# Patient Record
Sex: MALE | Race: WHITE | HISPANIC OR LATINO | URBAN - METROPOLITAN AREA
[De-identification: names, ages, dates, MRNs, and addresses within clinical notes are randomized per-mention and may not be internally consistent; named-entity substitution may affect disease eponyms.]

---
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
## 2022-03-12 ENCOUNTER — EMERGENCY (EMERGENCY)
Facility: HOSPITAL | Age: 22
LOS: 0 days | Discharge: HOME | End: 2022-03-12
Attending: EMERGENCY MEDICINE | Admitting: EMERGENCY MEDICINE
Payer: MEDICAID

## 2022-03-12 VITALS
HEIGHT: 62 IN | TEMPERATURE: 98 F | SYSTOLIC BLOOD PRESSURE: 119 MMHG | OXYGEN SATURATION: 99 % | RESPIRATION RATE: 16 BRPM | WEIGHT: 119.93 LBS | HEART RATE: 77 BPM | DIASTOLIC BLOOD PRESSURE: 73 MMHG

## 2022-03-12 DIAGNOSIS — S40.012A CONTUSION OF LEFT SHOULDER, INITIAL ENCOUNTER: ICD-10-CM

## 2022-03-12 DIAGNOSIS — K21.9 GASTRO-ESOPHAGEAL REFLUX DISEASE WITHOUT ESOPHAGITIS: Chronic | ICD-10-CM

## 2022-03-12 DIAGNOSIS — Z99.89 DEPENDENCE ON OTHER ENABLING MACHINES AND DEVICES: ICD-10-CM

## 2022-03-12 DIAGNOSIS — Z88.8 ALLERGY STATUS TO OTHER DRUGS, MEDICAMENTS AND BIOLOGICAL SUBSTANCES: ICD-10-CM

## 2022-03-12 DIAGNOSIS — Z90.89 ACQUIRED ABSENCE OF OTHER ORGANS: Chronic | ICD-10-CM

## 2022-03-12 DIAGNOSIS — G80.9 CEREBRAL PALSY, UNSPECIFIED: ICD-10-CM

## 2022-03-12 DIAGNOSIS — Z99.0 DEPENDENCE ON ASPIRATOR: ICD-10-CM

## 2022-03-12 DIAGNOSIS — K21.9 GASTRO-ESOPHAGEAL REFLUX DISEASE WITHOUT ESOPHAGITIS: ICD-10-CM

## 2022-03-12 DIAGNOSIS — F84.0 AUTISTIC DISORDER: ICD-10-CM

## 2022-03-12 DIAGNOSIS — M79.602 PAIN IN LEFT ARM: ICD-10-CM

## 2022-03-12 DIAGNOSIS — Z91.040 LATEX ALLERGY STATUS: ICD-10-CM

## 2022-03-12 DIAGNOSIS — Z98.890 OTHER SPECIFIED POSTPROCEDURAL STATES: Chronic | ICD-10-CM
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
## 2022-03-12 DIAGNOSIS — G47.33 OBSTRUCTIVE SLEEP APNEA (ADULT) (PEDIATRIC): ICD-10-CM

## 2022-03-12 PROCEDURE — 73060 X-RAY EXAM OF HUMERUS: CPT | Mod: 26,LT

## 2022-03-12 PROCEDURE — 73090 X-RAY EXAM OF FOREARM: CPT | Mod: 26,LT

## 2022-03-12 PROCEDURE — 99284 EMERGENCY DEPT VISIT MOD MDM: CPT

## 2022-03-12 RX ORDER — IBUPROFEN 200 MG
400 TABLET ORAL ONCE
Refills: 0 | Status: COMPLETED | OUTPATIENT
Start: 2022-03-12 | End: 2022-03-12

## 2022-03-12 RX ADMIN — Medication 400 MILLIGRAM(S): at 15:50

## 2022-03-12 NOTE — ED ADULT TRIAGE NOTE - NS ED NURSE BANDS TYPE
Rest, ice,  Take ibuprofen for pain, Norco for extreme pain, no driving or drinking while taking Norco take Flexeril to relax muscles, no driving or drinking while taking Flexeril  Follow-up with your primary care doctor or family practice call on Monday for an appointment  Return to the emergency room if symptoms worsen.   Name band;

## 2022-03-12 NOTE — ED PROVIDER NOTE - OBJECTIVE STATEMENT
20 y/o M with PHMx autism presents with mom for left arm pain. Per mom pt fell on his left shoulder while living at [facility] and had a negative XR of his left shoulder on 3/9. Mom brings him to the ED today for continuing left shoulder/ arm pain. Denies new trauma/falls, numbness and weakness.

## 2022-03-12 NOTE — ED PROVIDER NOTE - PHYSICAL EXAMINATION
CONSTITUTIONAL: Well-developed; well-nourished; in no acute distress.  SKIN: Skin exam is warm and dry, no acute rash.  HEAD: Normocephalic; atraumatic.  NECK: Supple; non tender.  EXT: +TTP left shoulder. No tenderness of left elbow/ wrist/ hand. Normal ROM. No clubbing, cyanosis or edema.  NEURO: Alert, oriented. Grossly unremarkable. No focal deficits.  PSYCH: Cooperative, appropriate, at baseline mental status per mom.

## 2022-03-12 NOTE — ED PROVIDER NOTE - PATIENT PORTAL LINK FT
You can access the FollowMyHealth Patient Portal offered by [facility] by registering at the following website: [email or web address]. By joining [employer]’s FollowMyHealth portal, you will also be able to view your health information using other applications (apps) compatible with our system.

## 2022-03-12 NOTE — ED PROVIDER NOTE - CARE PROVIDER_API CALL
Jose Torres (MD)  Orthopaedic Surgery  [address] [place] [address]  Phone: [phone]  Fax: [phone]  Follow Up Time:

## 2022-03-12 NOTE — ED PROVIDER NOTE - CLINICAL SUMMARY MEDICAL DECISION MAKING FREE TEXT BOX
21y male left dominant above PMH bib mom for eval of LUE pain since incident at [facility] (pt states he fell, per mom per staff pt had to be restrained once), had shoulder xr negative for fracture but pain persists, called pediatrician who referred to ED, no fever, no other symptoms, on exam vital signs appreciated, pt has difficulty complying with exam, has no marks of trauma and no swelling, 2+ pulses and cap refill <2s, has FROM at had, wrist, and shoulder but limited abduction/external rotation of shoulder due to pain with tenderness to entire LUE, imaging appreciated and d/w mother possible rotator cuff injury, plexus injury less likely but again neuro exam limited, will place sling for 24 hours, nsaids, f/u with ortho, early ROM encouraged. Mom counseled regarding conditions which should prompt return.

## 2022-03-12 NOTE — ED PROVIDER NOTE - NSFOLLOWUPINSTRUCTIONS_ED_ALL_ED_FT
Follow up with your primary care doctor and orthopedic in 1-2 days        Shoulder Pain    WHAT YOU NEED TO KNOW:    Shoulder pain is a common problem that can affect your daily activities. Pain can be caused by a problem within your shoulder, such as soreness of a tendon or bursa. A tendon is a cord of tough tissue that connects your muscles to your bones. The bursa is a fluid-filled sac that acts as a cushion between a bone and a tendon. Shoulder pain may also be caused by pain that spreads to your shoulder from another part of your body.    Shoulder Anatomy         DISCHARGE INSTRUCTIONS:    Return to the emergency department if:   •You have severe pain.      •You cannot move your arm or shoulder.      •You have numbness or tingling in your shoulder or arm.      Contact your healthcare provider if:   •Your pain gets worse or does not go away with treatment.      •You have trouble moving your arm or shoulder.      •You have questions or concerns about your condition or care.      Medicines: You may need any of the following:   •Acetaminophen decreases pain and fever. It is available without a doctor's order. Ask how much to take and how often to take it. Follow directions. Read the labels of all other medicines you are using to see if they also contain acetaminophen, or ask your doctor or pharmacist. Acetaminophen can cause liver damage if not taken correctly. Do not use more than 4 grams (4,000 milligrams) total of acetaminophen in one day.       •NSAIDs, such as ibuprofen, help decrease swelling, pain, and fever. This medicine is available with or without a doctor's order. NSAIDs can cause stomach bleeding or kidney problems in certain people. If you take blood thinner medicine, always ask your healthcare provider if NSAIDs are safe for you. Always read the medicine label and follow directions.      •Take your medicine as directed. Contact your healthcare provider if you think your medicine is not helping or if you have side effects. Tell him of her if you are allergic to any medicine. Keep a list of the medicines, vitamins, and herbs you take. Include the amounts, and when and why you take them. Bring the list or the pill bottles to follow-up visits. Carry your medicine list with you in case of an emergency.      Manage your symptoms:   •Apply ice on your shoulder for 20 to 30 minutes every 2 hours or as directed. Use an ice pack, or put crushed ice in a plastic bag. Cover it with a towel before you apply it to your shoulder. Ice helps prevent tissue damage and decreases swelling and pain.      •Apply heat if ice does not help your symptoms. Apply heat on your shoulder for 20 to 30 minutes every 2 hours for as many days as directed. Heat helps decrease pain and muscle spasms.      •Limit activities as directed. Try to avoid repeated overhead movements.      •Go to physical or occupational therapy as directed. A physical therapist teaches you exercises to help improve movement and strength, and to decrease pain. An occupational therapist teaches you skills to help with your daily activities.       Prevent shoulder pain:   •Maintain a good range of motion in your shoulder. Ask your healthcare provider which exercises you should do on a regular basis after you have healed.       •Stretch and strengthen your shoulder. Use proper technique during exercises and sports.      Follow up with your healthcare provider or orthopedist as directed: Write down your questions so you remember to ask them during your visits.        © Copyright [employer] 2022

## 2022-03-12 NOTE — ED PROVIDER NOTE - ATTENDING CONTRIBUTION TO CARE
21y male left dominant above PMH bib mom for eval of LUE pain since incident at [facility] (pt states he fell, per mom per staff pt had to be restrained once), had shoulder xr negative for fracture but pain persists, called pediatrician who referred to ED, no fever, no other symptoms, on exam vital signs appreciated, pt has difficulty complying with exam, has no marks of trauma and no swelling, 2+ pulses and cap refill <2s, has FROM at had, wrist, and shoulder but limited abduction/external rotation of shoulder due to pain with tenderness to entire LUE, imaging appreciated and d/w mother possible rotator cuff injury, plexus injury less likely but again neuro exam limited, will place sling for 24 hours, nsaids, f/u with ortho, early ROM encouraged. Mom counseled regarding conditions which should prompt return.

## 2022-03-14 NOTE — DIETITIAN INITIAL EVALUATION PEDIATRIC - OTHER INFO
Pt ambulated to restroom with RN x1 assist. Pt tolerated well. Updated on plan of care.       Zeynep Chaparro RN  03/14/22 3602 Reason for assessment: LOS. Pt with CP, ASD, GERD, epilepsy, central apnea, recent dx of R sided PNA, presented to ED with fever, right side pain, found to have R-sidded effusion, pt is s/p chest tube placement.

## 2022-04-12 NOTE — DIETITIAN INITIAL EVALUATION PEDIATRIC - PERTINENT LABORATORY DATA
Pt here for CBC and review. CBC is stable for this patient at this time. Platelet count is 71 and pt tolerating daily Lovenox injections. Pt stated he is having a TIPS procedure done this Thursday 4/14 and was instructed to hold Lovenox 48 hours prior but asked about when to resume. Message sent to Dr. Espino for instructions. Told patient I would call with MD response. Copy of labs provided and f/u appointment reviewed.    Lab Results   Component Value Date    WBC 3.45 04/12/2022    HGB 8.1 (L) 04/12/2022    HCT 24.6 (L) 04/12/2022    MCV 90.8 04/12/2022    PLT 71 (L) 04/12/2022      (11/6) H/H 11.5/36.2, K 5.5

## 2022-05-26 NOTE — ED PROVIDER NOTE - NS ED SCRIBE STATEMENT
Consult Note  Palliative Care      Consult Requested By: Sadaf Grier MD  Reason for Consult: Goals of care    SUBJECTIVE:     History of Present Illness:  Disease Process: End Stage Renal Disease, Advanced Cardiac Disease    72F with PMH of ESRD on HD MWF, HFpEF, CVA and PE on AC was BIBEMS after a witnessed cardiac arrest at home. Pt ambulated with a walker at baseline, has no children, next of kin are her adult brother and sister. Pt has been living with her special needs sister in a [employer] mobile home since [place] and is driven to and from HD by her brother and has generally been well supported.    Siblings report pt completed HD on Monday 5/23 but her line clotted and the next evening she developed TIA-like symptoms with abnormal arm movements, diaphoresis, facial droop and speech difficulty. Called EMS and by their arrival she was VSS with symptoms apparently resolved. Later that night sometimes after 2100 on 5/24 she was lying down, complained of back pain and became unresponsive. EMS recalled and pt was found in pulseless VT and she was successfully cardioverted back into ROSC. Lost pulse again en route to ED and CPR was continued after arrival to ED with epi, calcium and bicarb pushed. Intubated and ROSC was achieved at 2253.    Pt remained unresponsive off sedation and developed seizure like activity. Stat labs showed profound metabolic disturbances with LA 10.5, pH 7.1, K > 9. Became hypothermic. Reportedly had reactive pupils and a faint gag reflex on arrival to ICU for post arrest care.  NCHCT obtaiend and was non-acute. Started on keppra for seizures, zosyn for aspiration. Nephro consulted for emergent HD.    Interval Hospital Course    5/25: Propofol held in AM to determine if still seizing, immediately provoked severe HTN and was restarted. Fistula had no thrill and a central line was requested for HD access while continuing aggressive potassium shifting. Cards consulted and reported not a cath candidate  given suspected grim neuro prognosis. Pt was dialyzed and started on levo afterwards. EEG performed showing minimal cortical activity c/w catastrophic KIERAN.    5/26: Active warming started early this morning. ICU team reports interval worsening neuro exam with loss of pupillary, gag and cough reflexes. Family gathering for discussion of grave prognosis and next steps.    Palliative has been consulted for goals of care.    At time of interview pt is intubated and unresponsive to all stimuli, not triggering breaths with eccentric pupils, disconjugate gaze, absent corneal reflex. Limbs are spastic without posturing and toes are cool to touch.    Pt's niece and primary caretaker Karyna is at bedside with pleasant affect and minimal health literacy. She reports she has been calling multiple family members to bring them to bedside. Karyna feels that she should be the principal decision maker due to her caretaker role however legally pt's nine adult siblings are next of kin and decisions will be made by consensus among those willing and able to participate. Karyna is not an appropriate surrogate decision maker and consents so far have been from pt's available siblings. Karyna reports pt's eldest brother is [place] ([phone]) and that he is en route with unclear ETA.    Another niece arrived during interview and is visibly shaken, questioning why pt was given thrombolytics in vascular clinic and why she arrested afterwards. I advised her that unfortunately I suspect that pt had a catastrophic thromboembolic event that is an unpredictable and ultimately unavoidable risk of appropriate medical mgmt for her HD difficulties.     Pt's neuro exam has deteriorated such that brain death is more likely than not and the ICU should proceed formal brain death testing if the family will not accept compassionate extubation.    Further CPR is medically futile.     No past medical history on file.  No past surgical history on file.  No  family history on file.       Mental Status: Non-responsive    ECOG Performance Status Grade: 4 - Completely disabled    Review of Systems:  Review of systems not obtained due to patient factors unresponsive.    Review of Symptoms    Symptom Assessment (ESAS 0-10 Scale)  Unable to complete assessment due to Patient unresponsive     CAM / Delirium:  Negative  Constipation:  Negative  Diarrhea:  Negative    Bowel Management Plan (BMP):  No      Pain Assessment in Advanced Demential Scale (PAINAD)   Breathing - Independent of vocalization:  0  Negative vocalization:  0  Facial expression:  0  Body language:  0  Consolability:  0  Total:  0    ECOG Performance Status thGthrthathdtheth:th th5th Living Arrangements:  Lives with family and Lives in home    Psychosocial/Cultural: Lives with developmentally delayed niece    Spiritual:  F - Radha and Belief:  Unknown  I - Importance:  Unknown  C - Community:  9 siblings and extended family  A - Address in Care:  Family meeting today     Time-Based Charting:  Yes  Chart Review: 25 minutes  Face to Face: 20 minutes  Symptom Assessment: 15 minutes  Coordination of Care: 15 minutes    Total Time Spent: 75 minutes      Advance Care Planning   Advance Directives:   Living Will: No        Oral Declaration: No    LaPOST: No    Do Not Resuscitate Status: No    Medical Power of : No        Oral Declaration: No    Agent's Name:  [place]   Agent's Contact Number:  [phone]    Decision Making:  Family answered questions and Patient unable to communicate due to disease severity/cognitive impairment           OBJECTIVE:     Physical Exam  Constitutional:       Appearance: She is normal weight. She is toxic-appearing.      Interventions: She is intubated.   HENT:      Head: Normocephalic and atraumatic.   Eyes:      Comments: Eccentric pupils and disconjugate gaze   Cardiovascular:      Rate and Rhythm: Normal rate and regular rhythm.      Heart sounds: Murmur heard.   Pulmonary:       Effort: She is intubated.      Breath sounds: Rhonchi present.   Abdominal:      General: Abdomen is flat. There is no distension.      Palpations: Abdomen is soft.   Musculoskeletal:         General: No swelling. Normal range of motion.   Neurological:      Cranial Nerves: Cranial nerve deficit present.      Motor: Abnormal muscle tone (spastic) present. No seizure activity.           ASSESSMENT/PLAN:     72F with ESRD on HD with recent thrombosis of LUE AVF s/p outpt thrombolysis (unclear details) presented after a witnessed cardiac arrest at home suspected 2/2 catastrophic VTE. Admitted to ICU for post-arrest care after prolonged CPR with profound metabolic acidosis, hyperkalemia, hypothermia. Intubated with minimal brainstem reflexes and has since lost all responses to stimuli and is apneic on MV off sedation. Palliative consulted for goals of care.    Pt's neuro exam is suspicious for brain death and I agree with proceeding with brain death testing per protocol if family does not agree to compassionate extubation. ICU team to follow up with further goals discussion shortly. Unfortunately pt has no MPOA or children and her devoted caretaker/niece is neither legal next of kin nor an appropriate surrogate decision maker given her intellectual disability. Awaiting pt's adult siblings to arrive for family meeting, awaiting update from primary RN and will assist when they are present.    Recommendations:  Medical: no escalation of care  Symptom Management: unresponsive off sedation at this time  Psychosocial: permanently incapacitated  Legal: no apparent MPOA; legally decisions are per consensus of up to 9 adult siblings if they are willing and able to participate  Prognosis: actively dying; if brain death is confirmed pt will be deemed  under [place] law and withdrawal of care will be compulsory    Ger Mccormack MD  Hospice and Palliative Medicine  Palliative Care Pager: [phone]   ACP

## 2022-07-26 ENCOUNTER — NON-APPOINTMENT (OUTPATIENT)
Age: 22
End: 2022-07-26

## 2022-07-26 ENCOUNTER — APPOINTMENT (OUTPATIENT)
Dept: SPINE | Facility: CLINIC | Age: 22
End: 2022-07-26

## 2022-07-26 VITALS — BODY MASS INDEX: 21.16 KG/M2 | WEIGHT: 115 LBS | HEIGHT: 62 IN

## 2022-07-26 PROCEDURE — 99203 OFFICE O/P NEW LOW 30 MIN: CPT

## 2022-07-26 NOTE — PLAN
[FreeTextEntry1] : We will go and update his x-rays and also obtain CT thoracic and lumbar spine anticipation of surgical planning.  Answered all of his questions and his mom's questions to the best my ability.\par \par I spent 30 minutes on this visit.\par \par Kalia Karimi M.D., M.Sc.\par \par Department of Neurosurgery\par [facility]\par [facility]\par [facility]\par [place], [place]\par [email or web address]\par [phone]

## 2022-07-26 NOTE — HISTORY OF PRESENT ILLNESS
[FreeTextEntry1] : Patient is a 21-year-old man with significant past medical history of seizure disorder bipolar disorder autism as well as psychiatric history presents for evaluation of scoliosis

## 2022-08-04 ENCOUNTER — OUTPATIENT (OUTPATIENT)
Dept: OUTPATIENT SERVICES | Facility: HOSPITAL | Age: 22
LOS: 1 days | Discharge: HOME | End: 2022-08-04

## 2022-08-04 ENCOUNTER — RESULT REVIEW (OUTPATIENT)
Age: 22
End: 2022-08-04

## 2022-08-04 ENCOUNTER — NON-APPOINTMENT (OUTPATIENT)
Age: 22
End: 2022-08-04

## 2022-08-04 DIAGNOSIS — Z90.89 ACQUIRED ABSENCE OF OTHER ORGANS: Chronic | ICD-10-CM

## 2022-08-04 DIAGNOSIS — M41.125 ADOLESCENT IDIOPATHIC SCOLIOSIS, THORACOLUMBAR REGION: ICD-10-CM

## 2022-08-04 DIAGNOSIS — Z98.890 OTHER SPECIFIED POSTPROCEDURAL STATES: Chronic | ICD-10-CM

## 2022-08-04 DIAGNOSIS — K21.9 GASTRO-ESOPHAGEAL REFLUX DISEASE WITHOUT ESOPHAGITIS: Chronic | ICD-10-CM

## 2022-08-04 PROCEDURE — 72082 X-RAY EXAM ENTIRE SPI 2/3 VW: CPT | Mod: 26

## 2022-08-04 PROCEDURE — 77072 BONE AGE STUDIES: CPT | Mod: 26

## 2022-08-04 PROCEDURE — 72131 CT LUMBAR SPINE W/O DYE: CPT | Mod: 26

## 2022-08-05 NOTE — DISCHARGE NOTE PROVIDER - NSDCCAREPROVSEEN_GEN_ALL_CORE_FT
10 am To CT with OR nurse. 10:45 Returned from CT.
DURAGEN PLUS DURAL REGENERATION MATRIX IMPLANT  REF: [id number]  LOT: [id number]  EXP: 2025-03-31
Patient has blue bag with a cell phone and trach supplies in it. Patient refuses to give the bag to nurses to go back to his room. Belongings put in a patient bag with his name on it and kept at his bedside on his bed.
SURGIFOAM WAS GIVEN TO THE STERILE FIELD TO BE USED BY MD DURING PROCEDURE  REF: [id number]  LOT: [id number]  EXP: 03/01/2026    SURGICEL WAS GIVEN TO THE STERILE FIELD TO BE USED BY MD DURING PROCEDURE  REF: 1949  LOT: [id number]  EXP: 01/31/2027    FLOSEAL 10mL WAS GIVEN TO THE STERILE FIELD TO BE USED BY MD  REF: [id number]  LOT: [id number]  EXP: 05/14/2025
Spoke with family waiting, brother Uli De Dios, at start of case
TRANSFER - IN REPORT:    Verbal report received from Keiry Pearson RN(name) on Severo Zoepins  being received from 6S(unit) for ordered procedure      Report consisted of patients Situation, Background, Assessment and   Recommendations(SBAR). Information from the following report(s) SBAR and Kardex was reviewed with the receiving nurse. Opportunity for questions and clarification was provided. Assessment completed upon patients arrival to unit and care assumed.
Dejon Stevens

## 2022-08-08 DIAGNOSIS — Z02.9 ENCOUNTER FOR ADMINISTRATIVE EXAMINATIONS, UNSPECIFIED: ICD-10-CM

## 2022-08-30 ENCOUNTER — APPOINTMENT (OUTPATIENT)
Dept: SPINE | Facility: CLINIC | Age: 22
End: 2022-08-30

## 2022-08-30 DIAGNOSIS — M41.127 ADOLESCENT IDIOPATHIC SCOLIOSIS, LUMBOSACRAL REGION: ICD-10-CM

## 2022-08-30 PROCEDURE — 99214 OFFICE O/P EST MOD 30 MIN: CPT

## 2022-08-30 NOTE — PLAN
[FreeTextEntry1] : This curve is stable measuring 45degrees at this point with the curve remaining stable I think that waiting on surgery would be reasonable given his recent discharge from [facility] and also his improvement in pain with his weight loss I would like to watch him for another 6 months he and his mom will return in 6 months for another consultation at that point in time I answered all her questions the best my ability.\par \par I spent 30 minutes on this visit.\par \par Kalia Karimi M.D., M.Sc.\par \par Department of Neurosurgery\par [facility]\par [facility]\par [facility]\par [place], [place]\par [email or web address]\par [phone]

## 2022-08-30 NOTE — HISTORY OF PRESENT ILLNESS
[FreeTextEntry1] : Patient returns today.  They recently returned from at [facility] with her on vacation Marcell has occasional pain and it was particularly bad while riding in the car to and from [place]

## 2022-10-14 NOTE — DISCHARGE NOTE PROVIDER - NSDCMRMEDTOKEN_GEN_ALL_CORE_FT
clonazePAM 0.5 mg oral tablet: 1 tab(s) orally 2 times a day  divalproex sodium 125 mg oral delayed release capsule: 5 cap(s) orally every 12 hours  famotidine 40 mg/5 mL oral liquid: 5 milliliter(s) orally once a day (at bedtime)  FLUoxetine 10 mg oral capsule: 1 cap(s) orally   guanFACINE 4 mg oral tablet, extended release: 1 tab(s) orally once a day  QUEtiapine 200 mg oral tablet, extended release: tab(s) orally  2 tabs in morning  2 tabs night  1 tab afternoon   raNITIdine 15 mg/mL oral syrup: 5 milliliter(s) orally every 12 hours  risperiDONE 2 mg oral tablet: 1 tab(s) orally   
4 = No assist / stand by assistance

## 2022-12-05 NOTE — ED ADULT TRIAGE NOTE - BMI (KG/M2)
21.9
37 yo male with PMHx marijuana use, cyclic vomiting syndrome, pancreatitis who presents with abdominal pain starting today. Pain is epigastric and also in back. Multiple similar presentations in the past.   AP - Hd stable, appears uncomfortable. likely cyclic vomiting syndrome. will check labs, treat supportively. consider imaging if no improvement.

## 2023-01-04 NOTE — PROGRESS NOTE BEHAVIORAL HEALTH - NSBHADDHXFAMFT_PSY_A_CORE
Patient has 2 biological siblings, 22 y/o brother with bipolar disorder vs schizophrenia/schizoaffective and homicidal ideation. and 20 year old sister with depression vs. bipolar that is well managed on medications.
none

## 2023-01-11 NOTE — PHARMACOTHERAPY INTERVENTION NOTE - INTERVENTION CATEGORIES
Pt still continues to deny subjective s/s of withdrawal   RN notes no objective s/s of withdrawal at this time  Dora Brothers PA-C notified via face-to-face conversation, SEWS score continues to be deferred at this time  Therapy

## 2023-01-18 ENCOUNTER — EMERGENCY (EMERGENCY)
Facility: HOSPITAL | Age: 23
LOS: 0 days | Discharge: HOME | End: 2023-01-18
Attending: STUDENT IN AN ORGANIZED HEALTH CARE EDUCATION/TRAINING PROGRAM | Admitting: STUDENT IN AN ORGANIZED HEALTH CARE EDUCATION/TRAINING PROGRAM
Payer: MEDICAID

## 2023-01-18 VITALS
HEART RATE: 91 BPM | DIASTOLIC BLOOD PRESSURE: 89 MMHG | OXYGEN SATURATION: 98 % | RESPIRATION RATE: 18 BRPM | WEIGHT: 127.87 LBS | SYSTOLIC BLOOD PRESSURE: 138 MMHG | TEMPERATURE: 99 F

## 2023-01-18 VITALS
SYSTOLIC BLOOD PRESSURE: 105 MMHG | OXYGEN SATURATION: 98 % | RESPIRATION RATE: 18 BRPM | TEMPERATURE: 95 F | HEART RATE: 77 BPM | DIASTOLIC BLOOD PRESSURE: 55 MMHG

## 2023-01-18 DIAGNOSIS — Z98.890 OTHER SPECIFIED POSTPROCEDURAL STATES: Chronic | ICD-10-CM

## 2023-01-18 DIAGNOSIS — R11.2 NAUSEA WITH VOMITING, UNSPECIFIED: ICD-10-CM

## 2023-01-18 DIAGNOSIS — Z91.048 OTHER NONMEDICINAL SUBSTANCE ALLERGY STATUS: ICD-10-CM

## 2023-01-18 DIAGNOSIS — Z88.0 ALLERGY STATUS TO PENICILLIN: ICD-10-CM

## 2023-01-18 DIAGNOSIS — Z87.01 PERSONAL HISTORY OF PNEUMONIA (RECURRENT): ICD-10-CM

## 2023-01-18 DIAGNOSIS — R10.9 UNSPECIFIED ABDOMINAL PAIN: ICD-10-CM

## 2023-01-18 DIAGNOSIS — F84.0 AUTISTIC DISORDER: ICD-10-CM

## 2023-01-18 DIAGNOSIS — Z91.040 LATEX ALLERGY STATUS: ICD-10-CM

## 2023-01-18 DIAGNOSIS — K21.9 GASTRO-ESOPHAGEAL REFLUX DISEASE WITHOUT ESOPHAGITIS: Chronic | ICD-10-CM

## 2023-01-18 DIAGNOSIS — R19.7 DIARRHEA, UNSPECIFIED: ICD-10-CM

## 2023-01-18 DIAGNOSIS — F63.9 IMPULSE DISORDER, UNSPECIFIED: ICD-10-CM

## 2023-01-18 DIAGNOSIS — M41.9 SCOLIOSIS, UNSPECIFIED: ICD-10-CM

## 2023-01-18 DIAGNOSIS — Z87.19 PERSONAL HISTORY OF OTHER DISEASES OF THE DIGESTIVE SYSTEM: ICD-10-CM

## 2023-01-18 DIAGNOSIS — Z90.89 ACQUIRED ABSENCE OF OTHER ORGANS: Chronic | ICD-10-CM

## 2023-01-18 DIAGNOSIS — E87.1 HYPO-OSMOLALITY AND HYPONATREMIA: ICD-10-CM

## 2023-01-18 DIAGNOSIS — Z90.09 ACQUIRED ABSENCE OF OTHER PART OF HEAD AND NECK: ICD-10-CM

## 2023-01-18 DIAGNOSIS — G47.33 OBSTRUCTIVE SLEEP APNEA (ADULT) (PEDIATRIC): ICD-10-CM

## 2023-01-18 DIAGNOSIS — Z20.822 CONTACT WITH AND (SUSPECTED) EXPOSURE TO COVID-19: ICD-10-CM

## 2023-01-18 DIAGNOSIS — G80.9 CEREBRAL PALSY, UNSPECIFIED: ICD-10-CM

## 2023-01-18 DIAGNOSIS — Z86.69 PERSONAL HISTORY OF OTHER DISEASES OF THE NERVOUS SYSTEM AND SENSE ORGANS: ICD-10-CM

## 2023-01-18 DIAGNOSIS — Z88.8 ALLERGY STATUS TO OTHER DRUGS, MEDICAMENTS AND BIOLOGICAL SUBSTANCES STATUS: ICD-10-CM

## 2023-01-18 LAB
ALBUMIN SERPL ELPH-MCNC: 4.9 G/DL — SIGNIFICANT CHANGE UP (ref 3.5–5.2)
ALP SERPL-CCNC: 106 U/L — SIGNIFICANT CHANGE UP (ref 30–115)
ALT FLD-CCNC: 18 U/L — SIGNIFICANT CHANGE UP (ref 13–38)
ANION GAP SERPL CALC-SCNC: 14 MMOL/L — SIGNIFICANT CHANGE UP (ref 7–14)
AST SERPL-CCNC: 53 U/L — HIGH (ref 13–38)
BASOPHILS # BLD AUTO: 0.01 K/UL — SIGNIFICANT CHANGE UP (ref 0–0.2)
BASOPHILS NFR BLD AUTO: 0.2 % — SIGNIFICANT CHANGE UP (ref 0–1)
BILIRUB DIRECT SERPL-MCNC: <0.2 MG/DL — SIGNIFICANT CHANGE UP (ref 0–0.3)
BILIRUB INDIRECT FLD-MCNC: >0.1 MG/DL — LOW (ref 0.2–1.2)
BILIRUB SERPL-MCNC: 0.3 MG/DL — SIGNIFICANT CHANGE UP (ref 0.2–1.2)
BUN SERPL-MCNC: 19 MG/DL — SIGNIFICANT CHANGE UP (ref 10–20)
CALCIUM SERPL-MCNC: 9.5 MG/DL — SIGNIFICANT CHANGE UP (ref 8.4–10.5)
CHLORIDE SERPL-SCNC: 94 MMOL/L — LOW (ref 98–110)
CO2 SERPL-SCNC: 24 MMOL/L — SIGNIFICANT CHANGE UP (ref 17–32)
CREAT SERPL-MCNC: 0.6 MG/DL — LOW (ref 0.7–1.5)
EGFR: 142 ML/MIN/1.73M2 — SIGNIFICANT CHANGE UP
EOSINOPHIL # BLD AUTO: 0 K/UL — SIGNIFICANT CHANGE UP (ref 0–0.7)
EOSINOPHIL NFR BLD AUTO: 0 % — SIGNIFICANT CHANGE UP (ref 0–8)
FLUAV AG NPH QL: SIGNIFICANT CHANGE UP
FLUBV AG NPH QL: SIGNIFICANT CHANGE UP
GLUCOSE SERPL-MCNC: 144 MG/DL — HIGH (ref 70–99)
HCT VFR BLD CALC: 43.9 % — SIGNIFICANT CHANGE UP (ref 42–52)
HGB BLD-MCNC: 14.8 G/DL — SIGNIFICANT CHANGE UP (ref 14–18)
IMM GRANULOCYTES NFR BLD AUTO: 0.3 % — SIGNIFICANT CHANGE UP (ref 0.1–0.3)
LIDOCAIN IGE QN: 28 U/L — SIGNIFICANT CHANGE UP (ref 7–60)
LYMPHOCYTES # BLD AUTO: 0.4 K/UL — LOW (ref 1.2–3.4)
LYMPHOCYTES # BLD AUTO: 6.6 % — LOW (ref 20.5–51.1)
MAGNESIUM SERPL-MCNC: 1.9 MG/DL — SIGNIFICANT CHANGE UP (ref 1.8–2.4)
MCHC RBC-ENTMCNC: 28.5 PG — SIGNIFICANT CHANGE UP (ref 27–31)
MCHC RBC-ENTMCNC: 33.7 G/DL — SIGNIFICANT CHANGE UP (ref 32–37)
MCV RBC AUTO: 84.6 FL — SIGNIFICANT CHANGE UP (ref 80–94)
MONOCYTES # BLD AUTO: 0.84 K/UL — HIGH (ref 0.1–0.6)
MONOCYTES NFR BLD AUTO: 13.8 % — HIGH (ref 1.7–9.3)
NEUTROPHILS # BLD AUTO: 4.83 K/UL — SIGNIFICANT CHANGE UP (ref 1.4–6.5)
NEUTROPHILS NFR BLD AUTO: 79.1 % — HIGH (ref 42.2–75.2)
NRBC # BLD: 0 /100 WBCS — SIGNIFICANT CHANGE UP (ref 0–0)
PLATELET # BLD AUTO: 94 K/UL — LOW (ref 130–400)
POTASSIUM SERPL-MCNC: 6.2 MMOL/L — CRITICAL HIGH (ref 3.5–5)
POTASSIUM SERPL-SCNC: 6.2 MMOL/L — CRITICAL HIGH (ref 3.5–5)
PROT SERPL-MCNC: 8.4 G/DL — HIGH (ref 6–8)
RBC # BLD: 5.19 M/UL — SIGNIFICANT CHANGE UP (ref 4.7–6.1)
RBC # FLD: 12.5 % — SIGNIFICANT CHANGE UP (ref 11.5–14.5)
RSV RNA NPH QL NAA+NON-PROBE: SIGNIFICANT CHANGE UP
SARS-COV-2 RNA SPEC QL NAA+PROBE: SIGNIFICANT CHANGE UP
SODIUM SERPL-SCNC: 132 MMOL/L — LOW (ref 135–146)
VALPROATE SERPL-MCNC: 94 UG/ML — SIGNIFICANT CHANGE UP (ref 50–100)
WBC # BLD: 6.1 K/UL — SIGNIFICANT CHANGE UP (ref 4.8–10.8)
WBC # FLD AUTO: 6.1 K/UL — SIGNIFICANT CHANGE UP (ref 4.8–10.8)

## 2023-01-18 PROCEDURE — 99285 EMERGENCY DEPT VISIT HI MDM: CPT

## 2023-01-18 PROCEDURE — 93010 ELECTROCARDIOGRAM REPORT: CPT

## 2023-01-18 PROCEDURE — 71045 X-RAY EXAM CHEST 1 VIEW: CPT | Mod: 26

## 2023-01-18 PROCEDURE — 74019 RADEX ABDOMEN 2 VIEWS: CPT | Mod: 26

## 2023-01-18 PROCEDURE — 74177 CT ABD & PELVIS W/CONTRAST: CPT | Mod: 26,MA

## 2023-01-18 RX ORDER — ONDANSETRON 8 MG/1
4 TABLET, FILM COATED ORAL ONCE
Refills: 0 | Status: COMPLETED | OUTPATIENT
Start: 2023-01-18 | End: 2023-01-18

## 2023-01-18 RX ORDER — KETOROLAC TROMETHAMINE 30 MG/ML
30 SYRINGE (ML) INJECTION ONCE
Refills: 0 | Status: DISCONTINUED | OUTPATIENT
Start: 2023-01-18 | End: 2023-01-18

## 2023-01-18 RX ORDER — FAMOTIDINE 10 MG/ML
20 INJECTION INTRAVENOUS ONCE
Refills: 0 | Status: COMPLETED | OUTPATIENT
Start: 2023-01-18 | End: 2023-01-18

## 2023-01-18 RX ORDER — EPINEPHRINE 0.3 MG/.3ML
0.3 INJECTION INTRAMUSCULAR; SUBCUTANEOUS ONCE
Refills: 0 | Status: DISCONTINUED | OUTPATIENT
Start: 2023-01-18 | End: 2023-01-18

## 2023-01-18 RX ORDER — DIATRIZOATE MEGLUMINE 180 MG/ML
30 INJECTION, SOLUTION INTRAVESICAL ONCE
Refills: 0 | Status: COMPLETED | OUTPATIENT
Start: 2023-01-18 | End: 2023-01-18

## 2023-01-18 RX ORDER — SODIUM CHLORIDE 9 MG/ML
1000 INJECTION INTRAMUSCULAR; INTRAVENOUS; SUBCUTANEOUS ONCE
Refills: 0 | Status: COMPLETED | OUTPATIENT
Start: 2023-01-18 | End: 2023-01-18

## 2023-01-18 RX ORDER — MORPHINE SULFATE 50 MG/1
2 CAPSULE, EXTENDED RELEASE ORAL ONCE
Refills: 0 | Status: DISCONTINUED | OUTPATIENT
Start: 2023-01-18 | End: 2023-01-18

## 2023-01-18 RX ADMIN — DIATRIZOATE MEGLUMINE 30 MILLILITER(S): 180 INJECTION, SOLUTION INTRAVESICAL at 04:00

## 2023-01-18 RX ADMIN — Medication 30 MILLIGRAM(S): at 12:06

## 2023-01-18 RX ADMIN — MORPHINE SULFATE 2 MILLIGRAM(S): 50 CAPSULE, EXTENDED RELEASE ORAL at 05:00

## 2023-01-18 RX ADMIN — SODIUM CHLORIDE 1000 MILLILITER(S): 9 INJECTION INTRAMUSCULAR; INTRAVENOUS; SUBCUTANEOUS at 04:00

## 2023-01-18 RX ADMIN — ONDANSETRON 4 MILLIGRAM(S): 8 TABLET, FILM COATED ORAL at 04:00

## 2023-01-18 RX ADMIN — FAMOTIDINE 100 MILLIGRAM(S): 10 INJECTION INTRAVENOUS at 04:00

## 2023-01-18 NOTE — ED PROVIDER NOTE - NSFOLLOWUPINSTRUCTIONS_ED_ALL_ED_FT
Please continue to encourage plenty of fluids.  If patient develops vomiting, uncontrolled pain, return to the ER.    Enteritis    WHAT YOU NEED TO KNOW:    Enteritis is inflammation of the small intestine. It may be caused by eating foods or drinking liquids contaminated with a virus, bacteria, or parasites. It may also be caused by certain medicines, damage from radiation, and medical conditions such as Crohn disease.     DISCHARGE INSTRUCTIONS:    Return to the emergency department if:     You cannot stop vomiting.      You have not urinated for 12 hours.     Contact your healthcare provider if:     You have a fever over 101.5.       You have blood or mucus in your bowel movements.       You continue to vomit or have diarrhea for more than 3 days, even after treatment.      You have a dry mouth and eyes, you are urinating less than usual, and you feel dizzy when you stand up.       Your mouth or eyes are dry. You are not urinating as much or as often.      You are losing weight without trying.      You have questions or concerns about your condition or care.    Medicines:     Medicines may be given to fight an infection caused by bacteria or a parasite. You may also need medicines to slow or stop your diarrhea or vomiting. Do not take these medicines unless your healthcare provider say it is okay. Other medicines may be needed to treat medical conditions that are causing enteritis.       Take your medicine as directed. Contact your healthcare provider if you think your medicine is not helping or if you have side effects. Tell him of her if you are allergic to any medicine. Keep a list of the medicines, vitamins, and herbs you take. Include the amounts, and when and why you take them. Bring the list or the pill bottles to follow-up visits. Carry your medicine list with you in case of an emergency.    Manage enteritis:     Eat foods that help to decrease symptoms. Limit or avoid foods and liquids that are high in sugar, fat, and fiber to help relieve diarrhea. It may be helpful to avoid lactose. Lactose is a type of sugar that is found in milk products. You may be able to tolerate soups, broths, well-cooked vegetables, canned fruit, and baked or broiled meats. Ask your dietitian or healthcare provider if you should follow a special diet. You may need to avoid other foods if you have certain medical conditions such as celiac disease.       Drink liquids as directed. Ask how much liquid to drink each day and which liquids are best for you. It is important to prevent or treat dehydration. Even if you have been vomiting, suck on ice chips or take small sips of clear liquids often. Slowly increase the amount of clear liquids you drink. If you become dehydrated, you may need IV liquids.      Drink an oral rehydration solution (ORS) as directed. An ORS contains water, salts, and sugar that are needed to replace lost body fluids. Ask what kind of ORS to use, how much to drink, and where to get it.    Prevent enteritis: Enteritis that is caused by bacteria, parasites, or viruses can be prevented. The following may help to prevent this type of enteritis:    Wash your hands often. Use soap and water. Wash your hands after you use the bathroom, change a child's diapers, or sneeze. Wash your hands before you prepare or eat food. Handwashing           Clean surfaces and do laundry often. Wash your clothes and towels separately from the rest of the laundry. Clean surfaces in your home with antibacterial  or bleach.      Clean food thoroughly and cook safely. Wash raw vegetables before you cook. Cook meat, fish, and eggs fully. Do not use the same dishes for raw meat as you do for other foods. Refrigerate any leftover food immediately.      Be aware when you camp or travel. Drink only clean water. Do not drink from rivers or lakes unless you purify or boil the water first. When you travel, drink bottled water and do not add ice. Do not eat fruit that has not been peeled. Do not eat raw fish or meat that is not fully cooked.     Follow up with your healthcare provider as directed: Write down your questions so you remember to ask them during your visits.

## 2023-01-18 NOTE — CONSULT NOTE ADULT - SUBJECTIVE AND OBJECTIVE BOX
HPI: 21 yo male with CP, autism, seizures, severe GERD s/p fundoplication at 18months old presents with severe abdominal pain beginning last PM around 4. Mom states pt was fine throughout day, ate lunch without difficulty. Then began screaming with pain around 4PM. He states it was his whole abdomen. He vomited once. Mom states he did have diarrhea a few weeks ago and has been complaining of feeling like food is getting stuck and having pain after eating for several weeks now. He hasnt seen GI in a "while" Last EGD a "few years" ago.     Pt just had BM loose (after PO contrast) and is passing flatus. Has not vomited in ED        PAST MEDICAL & SURGICAL HISTORY:  Autism      GERD (gastroesophageal reflux disease)      Cerebral palsy      Seizures      Pneumonia  NOV 2020      BINDU on CPAP      Impulse disorder      Scoliosis      S/P tonsillectomy  adenoidectomy        H/O chest tube placement      GERD with stricture  Surgery done          MEDICATIONS  (STANDING):    MEDICATIONS  (PRN):      Allergies    Benadryl Allergy (Other)  latex (Other)  lithium (Rash)  penicillin (Hives)    Intolerances        SOCIAL HISTORY:    FAMILY HISTORY:          Physical Exam:  General: NAD, resting comfortably  HEENT: NC/AT, EOMI, normal hearing, no oral lesions, no LAD, neck supple  Pulmonary: normal resp effort, CTA-B  Cardiovascular: NSR, no murmurs  Abdominal: soft, ND/NT, no organomegaly, +bs  Extremities: WWP, normal strength, no clubbing/cyanosis/edema  Neuro: A/O x 3, CNs II-XII grossly intact, normal sensation, no focal deficits  Pulses: palpable distal pulses    Vital Signs Last 24 Hrs  T(C): 35.8 (18 Jan 2023 11:41), Max: 37 (18 Jan 2023 01:51)  T(F): 96.5 (18 Jan 2023 11:41), Max: 98.6 (18 Jan 2023 01:51)  HR: 84 (18 Jan 2023 11:41) (84 - 91)  BP: 125/73 (18 Jan 2023 11:41) (125/73 - 138/89)  BP(mean): --  RR: 18 (18 Jan 2023 11:41) (18 - 18)  SpO2: 99% (18 Jan 2023 11:41) (98% - 99%)    Parameters below as of 18 Jan 2023 11:41  Patient On (Oxygen Delivery Method): room air        I&O's Summary          LABS:                        14.8   6.10  )-----------( 94       ( 18 Jan 2023 05:00 )             43.9     01-18    132<L>  |  94<L>  |  19  ----------------------------<  144<H>  6.2<HH>   |  24  |  0.6<L>    Ca    9.5      18 Jan 2023 05:00  Mg     1.9     01-18    TPro  8.4<H>  /  Alb  4.9  /  TBili  0.3  /  DBili  <0.2  /  AST  53<H>  /  ALT  18  /  AlkPhos  106  01-18        CAPILLARY BLOOD GLUCOSE        LIVER FUNCTIONS - ( 18 Jan 2023 05:00 )  Alb: 4.9 g/dL / Pro: 8.4 g/dL / ALK PHOS: 106 U/L / ALT: 18 U/L / AST: 53 U/L / GGT: x                   RADIOLOGY & ADDITIONAL STUDIES:      Plan:

## 2023-01-18 NOTE — ED PROVIDER NOTE - PHYSICAL EXAMINATION
Physical Exam    Vital Signs: I have reviewed the initial vital signs.  Constitutional:  no acute distress  Eyes: Conjunctiva pink, Sclera clear, PERRLA, EOMI.  Cardiovascular: S1 and S2, regular rate, regular rhythm, well-perfused extremities, radial pulses equal and 2+  Respiratory: unlabored respiratory effort, clear to auscultation bilaterally no wheezing, rales and rhonchi  Gastrointestinal: soft, generalized tenderness, no pulsatile mass, normal bowl sounds  Musculoskeletal: supple neck, no lower extremity edema, no midline tenderness  Integumentary: warm, dry, no rash  Neurologic: awake, alert moving all extremities

## 2023-01-18 NOTE — ED ADULT NURSE NOTE - NSIMPLEMENTINTERV_GEN_ALL_ED
Implemented All Fall Risk Interventions:  [place] to call system. Call bell, personal items and telephone within reach. Instruct patient to call for assistance. Room bathroom lighting operational. Non-slip footwear when patient is off stretcher. Physically safe environment: no spills, clutter or unnecessary equipment. Stretcher in lowest position, wheels locked, appropriate side rails in place. Provide visual cue, wrist band, yellow gown, etc. Monitor gait and stability. Monitor for mental status changes and reorient to person, place, and time. Review medications for side effects contributing to fall risk. Reinforce activity limits and safety measures with patient and family.

## 2023-01-18 NOTE — ED PROVIDER NOTE - CARE PLAN
1 Principal Discharge DX:	SBO (small bowel obstruction)   Principal Discharge DX:	Nausea vomiting and diarrhea

## 2023-01-18 NOTE — ED PROVIDER NOTE - OBJECTIVE STATEMENT
22 year old male past medical history of Autism, cerebral palsy come to re for abd pain with nausea and vomiting.

## 2023-01-18 NOTE — ED PROVIDER NOTE - AGGRAVATING FACTORS
Been Following SD  Last OV 7/12/21  Last CPE 7/12/21  Last Labs TSH+Free T4, CBC, CMP, Lipid, Vit D 7/1/21    Last Rx fill Zolpidem 5mg #30 1R 7/2/21    Future Appointments   Date Time Provider Arpita Bautista   9/14/2021  9:00 Josh Ray MD [facility] none

## 2023-01-18 NOTE — ED PROVIDER NOTE - NS ED ATTENDING STATEMENT MOD
This was a shared visit with the LEILA. I reviewed and verified the documentation and independently performed the documented:

## 2023-01-18 NOTE — ED PROVIDER NOTE - ATTENDING APP SHARED VISIT CONTRIBUTION OF CARE
I reviewed and verified the documentation and  and independently performed the documented    He is-year-old male history of seizures autism on multiple medications presents with abdominal pain nausea vomiting since 4 PM abdomen soft mild generalized tenderness  exam normal no icterus no jaundice.  No function within normal limits mild elevation of AST 53 lipase normal   CT ap

## 2023-01-18 NOTE — ED PROVIDER NOTE - CLINICAL SUMMARY MEDICAL DECISION MAKING FREE TEXT BOX
22 year old male past medical history of Autism, cerebral palsy come to re for abd pain with nausea and vomiting. Labs and imaging personally reviewed.  No leukocytosis, mild hyponatremia noted.  CT shows evidence of SBO.  Patient and family were updated at bedside.  Surgery consulted, recommendations appreciated. 22 year old male past medical history of Autism, cerebral palsy come to re for abd pain with nausea and vomiting. Labs and imaging personally reviewed.  No leukocytosis, mild hyponatremia noted.  CT shows evidence concerning for SBO.  Patient and family were updated at bedside.  Surgery consulted, recommendations appreciated. Patient was in the ED had multiple loose bowel movements on reassessment smiling, well-appearing.  Abdomen soft nontender.  Patient was given juice, milk, drink both indicated well-appearing reassessment, abdomen soft, nontender.  Discussed return precautions, follow-up instructions, and family verbalized understanding.

## 2023-01-18 NOTE — CONSULT NOTE ADULT - ATTENDING COMMENTS
above noted discussed case with surgical resident and PA ct scan and labs noted pt had BM and Flatus

## 2023-01-18 NOTE — ED PROVIDER NOTE - PROGRESS NOTE DETAILS
Sx JAD Verdugo aware JR; JAD Verdugo viewed the XR, no contrast visualized. Surgery will not admit to their service. Plan to admit to medicine.

## 2023-01-18 NOTE — ED PROVIDER NOTE - PATIENT PORTAL LINK FT
You can access the FollowMyHealth Patient Portal offered by [facility] by registering at the following website: [email or web address]. By joining [facility]’s FollowMyHealth portal, you will also be able to view your health information using other applications (apps) compatible with our system.

## 2023-01-18 NOTE — CONSULT NOTE ADULT - ASSESSMENT
21 yo male with CP, autism with SB dilation.          Plan:  - keep NPO for now  -  23 yo male with CP, autism with SB dilation.          Plan:  - keep NPO for now  - get abdominal xray 3 views to evaluate if contrast has reached colon        Above D/W Dr Smiley  23 yo male with CP, autism with SB dilation.          Plan:  - keep NPO for now  - get abdominal xray 3 views to evaluate if contrast has reached colon        Above D/W Dr Smiley     Adden:  Pt feeling much improved. Denies pain, has had 2 BMs today and ++flatus. Father now at bedside and states pt is much improved.  Xray unable to assess for progression of contrast.  Dr Smiley aware  If family wants pt home rec close fup with PMD and GI

## 2023-02-08 ENCOUNTER — OUTPATIENT (OUTPATIENT)
Dept: OUTPATIENT SERVICES | Facility: HOSPITAL | Age: 23
LOS: 1 days | End: 2023-02-08
Payer: MEDICAID

## 2023-02-08 ENCOUNTER — APPOINTMENT (OUTPATIENT)
Dept: GASTROENTEROLOGY | Facility: CLINIC | Age: 23
End: 2023-02-08
Payer: MEDICAID

## 2023-02-08 ENCOUNTER — APPOINTMENT (OUTPATIENT)
Dept: GASTROENTEROLOGY | Facility: CLINIC | Age: 23
End: 2023-02-08

## 2023-02-08 VITALS
OXYGEN SATURATION: 98 % | WEIGHT: 108 LBS | HEART RATE: 153 BPM | HEIGHT: 62 IN | DIASTOLIC BLOOD PRESSURE: 66 MMHG | BODY MASS INDEX: 19.88 KG/M2 | SYSTOLIC BLOOD PRESSURE: 95 MMHG

## 2023-02-08 DIAGNOSIS — K21.9 GASTRO-ESOPHAGEAL REFLUX DISEASE WITHOUT ESOPHAGITIS: Chronic | ICD-10-CM

## 2023-02-08 DIAGNOSIS — Z00.00 ENCOUNTER FOR GENERAL ADULT MEDICAL EXAMINATION WITHOUT ABNORMAL FINDINGS: ICD-10-CM

## 2023-02-08 DIAGNOSIS — Z98.890 OTHER SPECIFIED POSTPROCEDURAL STATES: Chronic | ICD-10-CM

## 2023-02-08 DIAGNOSIS — Z78.9 OTHER SPECIFIED HEALTH STATUS: ICD-10-CM

## 2023-02-08 DIAGNOSIS — K21.9 GASTRO-ESOPHAGEAL REFLUX DISEASE W/OUT ESOPHAGITIS: ICD-10-CM

## 2023-02-08 DIAGNOSIS — K63.9 DISEASE OF INTESTINE, UNSPECIFIED: ICD-10-CM

## 2023-02-08 DIAGNOSIS — K56.600 PARTIAL INTESTINAL OBSTRUCTION, UNSPECIFIED AS TO CAUSE: ICD-10-CM

## 2023-02-08 DIAGNOSIS — Z90.89 ACQUIRED ABSENCE OF OTHER ORGANS: Chronic | ICD-10-CM

## 2023-02-08 PROCEDURE — 99204 OFFICE O/P NEW MOD 45 MIN: CPT | Mod: GC

## 2023-02-08 PROCEDURE — 99204 OFFICE O/P NEW MOD 45 MIN: CPT

## 2023-02-08 PROCEDURE — 87507 IADNA-DNA/RNA PROBE TQ 12-25: CPT

## 2023-02-08 PROCEDURE — 83993 ASSAY FOR CALPROTECTIN FECAL: CPT

## 2023-02-08 RX ORDER — POLYETHYLENE GLYCOL 3350 AND ELECTROLYTES WITH LEMON FLAVOR 236; 22.74; 6.74; 5.86; 2.97 G/4L; G/4L; G/4L; G/4L; G/4L
236 POWDER, FOR SOLUTION ORAL
Qty: 1 | Refills: 0 | Status: ACTIVE | COMMUNITY
Start: 2023-02-08 | End: 1900-01-01

## 2023-02-08 NOTE — REVIEW OF SYSTEMS
[Negative] : Integumentary [Fever] : no fever [Chills] : no chills [As Noted in HPI] : as noted in HPI [Abdominal Pain] : no abdominal pain [Vomiting] : no vomiting [Melena (black stool)] : no melena [Bleeding] : no bleeding [Confused] : no confusion [Convulsions] : no convulsions

## 2023-02-08 NOTE — ASSESSMENT
[FreeTextEntry1] : 21 y/o M with h/o ADHD, autism, GERD s/p fundoplication at age 18 months, seizure is here for initial evaluation.\par \par \par \par # Partial SBO with thickening and hyperenhancement on CT scan\par - r/o Crohn's Dz\par -History provided by father and mother.\par -He was hospitalized at [facility] on Jan 2023 for abdominal pain and vomiting. CT scan showed: dilated small bowel with multiple regions of luminal narrowing w/o definite transition points. There was also Moderate stool burden in colon.\par -Mother also reports patient is on Miralax daily and if not taking it will be constipated and on miralax has 3-4 BM sometimes watery but no blood. Last bowel movement this am\par -He also lost about 25 lbs over the past couple of months. \par -Social: negx3\par -FH: unknown, he was adopted  \par - CRP was normal\par - TSH and ASCA/ANCA --> WNL ( outpatient labs, will scan)\par \par Rec:\par - will send fecal calprotectin\par - will schedule for colonoscopy \par -family advised to stop the colon prep if patient expressed worsening abdominal pain, or developed vomiting \par - May benefit from MRE ( with sedation ) to evaluate small bowel after endoscopy\par \par \par #GERD s/p fundoplication\par # Dysphagia---> ? post surgery\par Patient reports food sometimes stuck in chest and have hard time pushing the food down.\par Patient has been taking Prevacid BID, famotidine and Carafate.\par \par Rec:\par - will plan for EGD--> small bowel involvement of CD + evaluate GEJ post fundoplication \par \par

## 2023-02-08 NOTE — HISTORY OF PRESENT ILLNESS
[FreeTextEntry1] : 23 y/o M with h/o ADHD, autism, GERD s/p fundoplication at age 18 months, seizure is here for initial evaluation.\par \par \par History provided by father and mother.\par \par He was hospitalized at [facility] on Jan 2023 for abdominal pain and vomiting. CT scan showed: dilated small bowel with multiple regions of luminal narrowing w/o definite transition points. There was also Moderate stool burden in colon.\par \par Patient reports food sometimes stuck in chest and have hard time pushing the food down.\par Mother also reports patient is on Miralax daily and if not taking it will be constipated and on miralax has 3-4 BM sometimes watery but no blood.\par \par He also lost about 25 lbs over the past couple of months. \par \par Patient has been taking Prevacid BID, famotidine and Carafate.\par \par Social: negx3\par FH: unknown, he was adopted

## 2023-02-08 NOTE — PHYSICAL EXAM
[Alert] : alert [Healthy Appearing] : healthy appearing [No Acute Distress] : no acute distress [Sclera] : the sclera and conjunctiva were normal [Hearing Threshold Finger Rub Not [place]] : hearing was normal [Normal Lips/Gums] : the lips and gums were normal [Oropharynx] : the oropharynx was normal [Normal Appearance] : the appearance of the neck was normal [No Neck Mass] : no neck mass was observed [No Respiratory Distress] : no respiratory distress [No Acc Muscle Use] : no accessory muscle use [Respiration, Rhythm And Depth] : normal respiratory rhythm and effort [Auscultation Breath Sounds / Voice Sounds] : lungs were clear to auscultation bilaterally [Heart Rate And Rhythm] : heart rate was normal and rhythm regular [Normal S1, S2] : normal S1 and S2 [Murmurs] : no murmurs [Bowel Sounds] : normal bowel sounds [Abdomen Tenderness] : non-tender [No Masses] : no abdominal mass palpated [Abdomen Soft] : soft [] : no hepatosplenomegaly

## 2023-02-10 ENCOUNTER — RESULT REVIEW (OUTPATIENT)
Age: 23
End: 2023-02-10

## 2023-02-10 ENCOUNTER — TRANSCRIPTION ENCOUNTER (OUTPATIENT)
Age: 23
End: 2023-02-10

## 2023-02-10 ENCOUNTER — OUTPATIENT (OUTPATIENT)
Dept: INPATIENT UNIT | Facility: HOSPITAL | Age: 23
LOS: 1 days | Discharge: HOME | End: 2023-02-10
Payer: MEDICAID

## 2023-02-10 VITALS
HEART RATE: 80 BPM | OXYGEN SATURATION: 99 % | DIASTOLIC BLOOD PRESSURE: 65 MMHG | RESPIRATION RATE: 18 BRPM | SYSTOLIC BLOOD PRESSURE: 142 MMHG

## 2023-02-10 VITALS
TEMPERATURE: 97 F | WEIGHT: 102.07 LBS | RESPIRATION RATE: 18 BRPM | HEART RATE: 51 BPM | HEIGHT: 64 IN | SYSTOLIC BLOOD PRESSURE: 118 MMHG | DIASTOLIC BLOOD PRESSURE: 55 MMHG

## 2023-02-10 DIAGNOSIS — K56.600 PARTIAL INTESTINAL OBSTRUCTION, UNSPECIFIED AS TO CAUSE: ICD-10-CM

## 2023-02-10 DIAGNOSIS — K56.609 UNSPECIFIED INTESTINAL OBSTRUCTION, UNSPECIFIED AS TO PARTIAL VERSUS COMPLETE OBSTRUCTION: ICD-10-CM

## 2023-02-10 DIAGNOSIS — Z98.890 OTHER SPECIFIED POSTPROCEDURAL STATES: Chronic | ICD-10-CM

## 2023-02-10 DIAGNOSIS — Z90.89 ACQUIRED ABSENCE OF OTHER ORGANS: Chronic | ICD-10-CM

## 2023-02-10 DIAGNOSIS — K21.9 GASTRO-ESOPHAGEAL REFLUX DISEASE WITHOUT ESOPHAGITIS: Chronic | ICD-10-CM

## 2023-02-10 DIAGNOSIS — K21.9 GASTRO-ESOPHAGEAL REFLUX DISEASE WITHOUT ESOPHAGITIS: ICD-10-CM

## 2023-02-10 PROCEDURE — 88312 SPECIAL STAINS GROUP 1: CPT | Mod: 26

## 2023-02-10 PROCEDURE — 88305 TISSUE EXAM BY PATHOLOGIST: CPT

## 2023-02-10 PROCEDURE — 45380 COLONOSCOPY AND BIOPSY: CPT

## 2023-02-10 PROCEDURE — 43239 EGD BIOPSY SINGLE/MULTIPLE: CPT | Mod: XS

## 2023-02-10 PROCEDURE — 88305 TISSUE EXAM BY PATHOLOGIST: CPT | Mod: 26

## 2023-02-10 PROCEDURE — 88312 SPECIAL STAINS GROUP 1: CPT

## 2023-02-10 RX ORDER — AMANTADINE HCL 100 MG
1 CAPSULE ORAL
Qty: 0 | Refills: 0 | DISCHARGE

## 2023-02-10 RX ORDER — OXCARBAZEPINE 300 MG/1
1 TABLET, FILM COATED ORAL
Qty: 0 | Refills: 0 | DISCHARGE

## 2023-02-10 RX ORDER — LANSOPRAZOLE 15 MG/1
1 CAPSULE, DELAYED RELEASE ORAL
Qty: 60 | Refills: 0
Start: 2023-02-10 | End: 2023-04-10

## 2023-02-10 RX ORDER — PERPHENAZINE 8 MG/1
1 TABLET, FILM COATED ORAL
Qty: 0 | Refills: 0 | DISCHARGE

## 2023-02-10 NOTE — ASU PATIENT PROFILE, ADULT - FALL HARM RISK - HARM RISK INTERVENTIONS

## 2023-02-10 NOTE — H&P PST ADULT - ASSESSMENT
GIB 23 y/o M with h/o ADHD, autism, GERD s/p fundoplication at age 18 months, seizure here for EGD for dysphagia to evaluate for small bowel involvement of CD + evaluate GEJ post fundoplication and colonoscopy for h/o Partial SBO with thickening and hyperenhancement on CT scan for ruling out Crohn's Dz

## 2023-02-10 NOTE — H&P PST ADULT - HISTORY OF PRESENT ILLNESS
23 y/o M with h/o ADHD, autism, GERD s/p fundoplication at age 18 months, seizure here for EGD for dysphagia to evaluate for small bowel involvement of CD + evaluate GEJ post fundoplication and colonoscopy for h/o Partial SBO with thickening and hyperenhancement on CT scan for ruling out Crohn's Dz

## 2023-02-10 NOTE — ASU PATIENT PROFILE, ADULT - NSICDXPASTMEDICALHX_GEN_ALL_CORE_FT
PAST MEDICAL HISTORY:  Autism     Cerebral palsy     Epilepsy     GERD (gastroesophageal reflux disease)     Impulse disorder     BINDU on CPAP     Pneumonia NOV 2020    Scoliosis     Seizures

## 2023-02-10 NOTE — ASU DISCHARGE PLAN (ADULT/PEDIATRIC) - ASU DC SPECIAL INSTRUCTIONSFT
- Follow up with our [facility] located at [address] [place] [address]. Phone Number: [phone]

## 2023-02-10 NOTE — CHART NOTE - NSCHARTNOTEFT_GEN_A_CORE
PACU ANESTHESIA ADMISSION NOTE      Procedure:   Post op diagnosis:      ____  Intubated  TV:______       Rate: ______      FiO2: ______    _x___  Patent Airway    _x___  Full return of protective reflexes    _x___  Full recovery from anesthesia / back to baseline   65  Vitals:   T:           R: 18                 BP:  108                Sat:  98                 P: 67  98    Mental Status:  xAwake   _____ Alert   _____ Drowsy   _____ Sedated    Nausea/Vomiting:  ____ NO  ______Yes,   See Post - Op Orders          Pain Scale (0-10):  _____    Treatment: ____ None    ____ See Post - Op/PCA Orders    Post - Operative Fluids:   ____ Oral   ____ See Post - Op Orders    Plan: Discharge:  x ____Home       _____Floor     _____Critical Care    _____  Other:_________________    Comments:

## 2023-02-13 LAB — SURGICAL PATHOLOGY STUDY: SIGNIFICANT CHANGE UP

## 2023-02-15 LAB
CALPROTECTIN FECAL: 188 UG/G
GI PCR PANEL: NOT DETECTED
SURGICAL PATHOLOGY STUDY: SIGNIFICANT CHANGE UP

## 2023-02-17 DIAGNOSIS — K21.00 GASTRO-ESOPHAGEAL REFLUX DISEASE WITH ESOPHAGITIS, WITHOUT BLEEDING: ICD-10-CM

## 2023-02-17 DIAGNOSIS — M41.9 SCOLIOSIS, UNSPECIFIED: ICD-10-CM

## 2023-02-17 DIAGNOSIS — G47.33 OBSTRUCTIVE SLEEP APNEA (ADULT) (PEDIATRIC): ICD-10-CM

## 2023-02-17 DIAGNOSIS — K64.8 OTHER HEMORRHOIDS: ICD-10-CM

## 2023-02-17 DIAGNOSIS — Z91.040 LATEX ALLERGY STATUS: ICD-10-CM

## 2023-02-17 DIAGNOSIS — K29.80 DUODENITIS WITHOUT BLEEDING: ICD-10-CM

## 2023-02-17 DIAGNOSIS — F63.9 IMPULSE DISORDER, UNSPECIFIED: ICD-10-CM

## 2023-02-17 DIAGNOSIS — F84.0 AUTISTIC DISORDER: ICD-10-CM

## 2023-02-17 DIAGNOSIS — K64.4 RESIDUAL HEMORRHOIDAL SKIN TAGS: ICD-10-CM

## 2023-02-17 DIAGNOSIS — K56.609 UNSPECIFIED INTESTINAL OBSTRUCTION, UNSPECIFIED AS TO PARTIAL VERSUS COMPLETE OBSTRUCTION: ICD-10-CM

## 2023-02-17 DIAGNOSIS — F90.9 ATTENTION-DEFICIT HYPERACTIVITY DISORDER, UNSPECIFIED TYPE: ICD-10-CM

## 2023-02-17 DIAGNOSIS — R13.10 DYSPHAGIA, UNSPECIFIED: ICD-10-CM

## 2023-02-17 DIAGNOSIS — K29.50 UNSPECIFIED CHRONIC GASTRITIS WITHOUT BLEEDING: ICD-10-CM

## 2023-02-17 DIAGNOSIS — Z88.8 ALLERGY STATUS TO OTHER DRUGS, MEDICAMENTS AND BIOLOGICAL SUBSTANCES STATUS: ICD-10-CM

## 2023-02-17 DIAGNOSIS — K60.2 ANAL FISSURE, UNSPECIFIED: ICD-10-CM

## 2023-02-17 DIAGNOSIS — G80.9 CEREBRAL PALSY, UNSPECIFIED: ICD-10-CM

## 2023-02-17 DIAGNOSIS — Z88.0 ALLERGY STATUS TO PENICILLIN: ICD-10-CM

## 2023-02-24 ENCOUNTER — OUTPATIENT (OUTPATIENT)
Dept: OUTPATIENT SERVICES | Facility: HOSPITAL | Age: 23
LOS: 1 days | End: 2023-02-24
Payer: MEDICAID

## 2023-02-24 ENCOUNTER — APPOINTMENT (OUTPATIENT)
Dept: GASTROENTEROLOGY | Facility: CLINIC | Age: 23
End: 2023-02-24
Payer: MEDICAID

## 2023-02-24 VITALS — WEIGHT: 110 LBS | HEIGHT: 62 IN | BODY MASS INDEX: 20.24 KG/M2 | TEMPERATURE: 97.3 F

## 2023-02-24 DIAGNOSIS — R11.10 VOMITING, UNSPECIFIED: ICD-10-CM

## 2023-02-24 DIAGNOSIS — Z98.890 OTHER SPECIFIED POSTPROCEDURAL STATES: Chronic | ICD-10-CM

## 2023-02-24 DIAGNOSIS — Z00.00 ENCOUNTER FOR GENERAL ADULT MEDICAL EXAMINATION WITHOUT ABNORMAL FINDINGS: ICD-10-CM

## 2023-02-24 DIAGNOSIS — J34.1 CYST AND MUCOCELE OF NOSE AND NASAL SINUS: ICD-10-CM

## 2023-02-24 DIAGNOSIS — K21.9 GASTRO-ESOPHAGEAL REFLUX DISEASE WITHOUT ESOPHAGITIS: Chronic | ICD-10-CM

## 2023-02-24 DIAGNOSIS — Z90.89 ACQUIRED ABSENCE OF OTHER ORGANS: Chronic | ICD-10-CM

## 2023-02-24 PROBLEM — G40.909 EPILEPSY, UNSPECIFIED, NOT INTRACTABLE, WITHOUT STATUS EPILEPTICUS: Chronic | Status: ACTIVE | Noted: 2023-02-10

## 2023-02-24 PROCEDURE — 99214 OFFICE O/P EST MOD 30 MIN: CPT | Mod: GC

## 2023-02-24 PROCEDURE — 99214 OFFICE O/P EST MOD 30 MIN: CPT

## 2023-02-24 NOTE — REVIEW OF SYSTEMS
[Recent Weight Loss (___ Lbs)] : recent [unfilled] ~Ulb weight loss [As Noted in HPI] : as noted in HPI [Negative] : Endocrine [Fever] : no fever [Chills] : no chills [Eye Pain] : no eye pain [Red Eyes] : eyes not red [Loss Of Hearing] : no hearing loss [Chest Pain] : no chest pain [Palpitations] : no palpitations [Shortness Of Breath] : no shortness of breath [Cough] : no cough [SOB on Exertion] : no shortness of breath during exertion

## 2023-02-24 NOTE — PHYSICAL EXAM
[Alert] : alert [Healthy Appearing] : healthy appearing [No Acute Distress] : no acute distress [Hearing Threshold Finger Rub Not [place]] : hearing was normal [Normal Appearance] : the appearance of the neck was normal [No Respiratory Distress] : no respiratory distress [Respiration, Rhythm And Depth] : normal respiratory rhythm and effort [Auscultation Breath Sounds / Voice Sounds] : lungs were clear to auscultation bilaterally [Heart Rate And Rhythm] : heart rate was normal and rhythm regular [Normal S1, S2] : normal S1 and S2 [Murmurs] : no murmurs [Bowel Sounds] : normal bowel sounds [Abdomen Tenderness] : non-tender [Abdomen Soft] : soft [Oriented To Time, Place, And Person] : oriented to person, place, and time

## 2023-02-24 NOTE — HISTORY OF PRESENT ILLNESS
[de-identified] : 2/10/23 \par erosive gastritis, nodule in the aNtrum, long tortuous narrowed esophagus, and a tight wrap at the GE junction. BIopsies were only +ve for mild chronic inflammation. [FreeTextEntry1] : 2/10/23\par  internal and external hemorrhoids with biopsies negative for inflammation.

## 2023-03-05 NOTE — PATIENT PROFILE PEDIATRIC. - FUNCTIONAL SCREEN CURRENT LEVEL: TOILETING, MLM
0 = independent 03-05    138  |  103  |  40<H>  ----------------------------<  112<H>  4.4   |  24  |  0.83    Ca    9.2      05 Mar 2023 06:08    A1C with Estimated Average Glucose Result: 5.2 % (02-25-23 @ 07:08)

## 2023-03-08 ENCOUNTER — RESULT REVIEW (OUTPATIENT)
Age: 23
End: 2023-03-08

## 2023-03-08 ENCOUNTER — OUTPATIENT (OUTPATIENT)
Dept: OUTPATIENT SERVICES | Facility: HOSPITAL | Age: 23
LOS: 1 days | End: 2023-03-08
Payer: MEDICAID

## 2023-03-08 DIAGNOSIS — Z90.89 ACQUIRED ABSENCE OF OTHER ORGANS: Chronic | ICD-10-CM

## 2023-03-08 DIAGNOSIS — K21.9 GASTRO-ESOPHAGEAL REFLUX DISEASE WITHOUT ESOPHAGITIS: Chronic | ICD-10-CM

## 2023-03-08 DIAGNOSIS — Z98.890 OTHER SPECIFIED POSTPROCEDURAL STATES: Chronic | ICD-10-CM

## 2023-03-08 DIAGNOSIS — Z00.8 ENCOUNTER FOR OTHER GENERAL EXAMINATION: ICD-10-CM

## 2023-03-08 DIAGNOSIS — R11.10 VOMITING, UNSPECIFIED: ICD-10-CM

## 2023-03-08 PROCEDURE — 74220 X-RAY XM ESOPHAGUS 1CNTRST: CPT | Mod: 26

## 2023-03-08 PROCEDURE — 74220 X-RAY XM ESOPHAGUS 1CNTRST: CPT

## 2023-03-09 DIAGNOSIS — R11.10 VOMITING, UNSPECIFIED: ICD-10-CM

## 2023-04-15 NOTE — PATIENT PROFILE PEDIATRIC. - SLEEP PROBLEMS, PROFILE
p/w sob, cough  CXR shows LLL infiltrate  Start azithromycin 500mg qd + rocephin 1g qd  Send strep ag, legionella, RVP, procalcitonin  tylenol prn none

## 2023-04-17 NOTE — DISCHARGE NOTE BEHAVIORAL HEALTH - NSBHDCCRISISPLAN1FT_PSY_A_CORE
Patient here to discuss labs and US for oligomenorrhea ( see previous note ). Reviewed medical, surgical, social and family history. Also reviewed current medications and allergies. Has only had 2 cycles over the last year since IUD removed. LMP 2/1/23. Ovarian function tests ( LH and FSH ) c/w menopausal change. Suspect perimenopausal.  Will repeat labs 6 months and instruct patient to use reliable contraception until no cycle x 1 year. US as follows:       EXAMINATION:   PELVIC ULTRASOUND; DOPPLER EVALUATION OF THE PELVIS       4/3/2023       TECHNIQUE:   Transabdominal and transvaginal pelvic duplex ultrasound using B-mode/gray   scaled imaging, Doppler spectral analysis and color flow Doppler was obtained. COMPARISON:   None used       HISTORY:   ORDERING SYSTEM PROVIDED HISTORY: Primary oligomenorrhea   TECHNOLOGIST PROVIDED HISTORY:   What reading provider will be dictating this exam?->CRC       FINDINGS:       Measurements:       Uterus: 5.9 cm       Endometrial stripe: 2 mm       Right Ovary:2.8 x 1.9 x 0.9 cm       Left Ovary: 1.8 x 1.7 x 1.2 cm           Ultrasound Findings:       Uterus: Uterus demonstrates normal myometrial echotexture. Endometrial stripe: Endometrial stripe is within normal limits. Right Ovary: Right ovary is within normal limits. There is normal arterial   and venous Doppler flow. No right adnexal mass. Left Ovary:  Left ovary is within normal limits. There is normal arterial and   venous Doppler flow. No left adnexal mass. Free Fluid: There is a small amount of simple appearing fluid in the left   hemipelvis. Impression   1. Normal appearance of the uterus and endometrium. There is a small amount   of simple appearing fluid in the left hemipelvis. 2.  Normal appearance of the bilateral ovaries. There are no adnexal masses. Normal Doppler flow within the ovaries.                   Discussed normal us with very thin lining, also
Contact outpatient provider or report to the nearest emergency department.

## 2023-06-21 ENCOUNTER — NON-APPOINTMENT (OUTPATIENT)
Age: 23
End: 2023-06-21

## 2023-08-02 NOTE — PROGRESS NOTE BEHAVIORAL HEALTH - NSBHFUPINTERVALHXFT_PSY_A_CORE
Calcipotriene Pregnancy And Lactation Text: This medication has not been proven safe during pregnancy. It is unknown if this medication is excreted in breast milk. 135 Chart review; no acute events overnight. Patient in good behavioral control.    Patient evaluated __ Chart review; no acute events overnight. Patient in good behavioral control.    Patient evaluated this morning. Asking for change in clothing. Chart review; no acute events overnight. Patient in good behavioral control.    Patient evaluated this morning. Reporting he had nightmares over night. Otherwise, denying voices and desire to hurt others or himself. Patient seen walking around unit with 1:1, in good mood. Chart review; no acute events overnight. Patient in good behavioral control. No PRNs required.     Patient evaluated this morning. Reporting he had nightmares over night. Otherwise, denying voices and desire to hurt others or himself. Patient seen walking around unit with 1:1, in good mood. Compliant with medications, denied side effects. Visible on the unit, interacting appropriately with peers.

## 2023-08-17 NOTE — DIETITIAN INITIAL EVALUATION ADULT. - NUTRITON FOCUSED PHYSICAL EXAM
EMERGENCY DEPARTMENT TREATMENT NOTE    CHIEF COMPLAINT(S): Neck pain    HISTORY OF PRESENT ILLNESS: This is a 50-year-old male with past medical history of hypertension, diabetes, and hyperlipidemia who presents to the emergency department with complaints of left upper back/trapezius pain for the past 2 weeks.  He denies any inciting trauma or injury but reports pain that shoots from his shoulder down into his left upper extremity towards his hand.  The pain is exacerbated with movements and upon palpation].  He denies any inciting trauma or injury to the area.  The patient denies any upper or lower extremity weakness, difficulty in ambulating, saddle anesthesia, urinary retention, or loss of control of his bladder or bowels.    PAST MEDICAL HISTORY: Hypertension, diabetes, hyperlipidemia    SURGICAL HISTORY: Eye surgery    ALLERGIES: Moxifloxacin    MEDICATIONS: Medication list reviewed, see EMR    FAMILY HISTORY: Hypertension and diabetes    SOCIAL HISTORY: Positive for Tobacco (>4 minutes was spent on counseling the patient on smoking cessation), occasional EtOH, denies illicit drugs       EXAMINATION OF ORGAN SYSTEMS/BODY AREAS:   General: Alert and Oriented, in no acute distress.  Blood pressure 134/89, heart rate 71, respiratory rate 18, temperature 36.8.  Pulse ox 97% on RA interpreted as normal.  HEENT: Mucus membranes moist, good dentition.  Cardiovascular: Regular rate and rhythm, normal S1/S2, no murmurs. 2+ radial and dorsalis pedis pulses bilaterally.  Respiratory: Clear to auscultation bilaterally. No wheezes or crackles. Normal respiratory effort  Gastrointestinal: Soft, non-distended, non-tender.  Musculoskeletal: [Full range of motion.  No swelling or deformities.  No midline tenderness to palpation, step-off, or deformity palpated along the cervical, thoracic, or lumbar spine.  Mild point tenderness to palpation over the left trapezius muscle which is palpably firm compared to the contralateral  side.  Skin: Warm and dry  Neurologic: Strength is 5/5 in all 4 extremities.  Equal  strength bilaterally.  Bilaterally, patient is able to wiggle all fingers, give thumbs up, make an OK sign, opposes all fingers with thumb without deficit.  Crosses fingers, makes fist, abducts all fingers. Sensation to light touch is intact throughout.  2+ radial pulses..  Normal gait.      MEDICAL DECISION MAKING AND COURSE IN THE ED WITH INTERPRETATION/REVIEW OF DIAGNOSTIC STUDIES: This is a 50-year-old male who presents with 2-week history of left upper back/trapezius pain with radiation down his left upper extremity consistent with cervical radiculopathy and/or brachial neuritis.  He denies any trauma or injury.  He is neurovascularly intact.  The patient has no concerning elements on physical examination or history to suggest acute spinal cord inujry/compression.  There is no history of trauma.     ED Medication Orders (From admission, onward)    Ordered Start     Status Ordering Provider    08/11/23 0007 08/11/23 0015  ibuprofen (MOTRIN) tablet 600 mg  ONCE         Last MAR action: Given CAMILA SALAMANCA    08/11/23 0007 08/11/23 0015  HYDROcodone-acetaminophen (NORCO) 5-325 MG per tablet 2 tablet  ONCE         Last MAR action: Given CAMILA SALAMANCA    08/11/23 0007 08/11/23 0015  dexAMETHasone (DECADRON) tablet 12 mg  ONCE         Last MAR action: Given CAMILA SALAMANCA        Imaging Results          CT CERVICAL SPINE WO CONTRAST (Final result)  Result time 08/11/23 02:32:23    Final result                 Impression:    1. No cervical spine fracture.  2. Straightening of the normal cervical lordosis may be related to patient  positioning but can be seen with muscle strain/spasm.  3. Multilevel degenerative disc and facet disease with the greatest degree  of canal stenosis at C5-C6 eccentric to the left and multilevel high-grade  foraminal narrowing including on the right at C3-C4 and on the left  at  C3-C4, C4-C5, C5-C6, and C6-C7.  4. Reticulonodular densities at the right lung apex may be due to  aspiration or infection, but follow-up chest CT scan is recommended to  ensure resolution.  5. Please see above for additional observations.    Electronically Signed by: JUAN LEDEZMA M.D.   Signed on: 8/11/2023 2:32 AM   Workstation ID: [id number]             Narrative:    EXAM: CT CERVICAL SPINE WO CONTRAST    CLINICAL INDICATION: cervical radiculopathy.  Pain.     COMPARISON: There are no prior examinations currently available for  comparison.    TECHNIQUE: Computed tomographic images of the cervical spine were obtained  without the administration of intravenous contrast, and axial, sagittal,  and coronal reformats were provided.  Automated exposure control and/or  iterative reconstruction technique was utilized as radiation dose reduction  strategy in this patient.    FINDINGS:     The normal cervical lordosis is straightened, with minimal anterolisthesis  of C3 on C4 and of C4 on C5, and minimal retrolisthesis of C5 on C6 and of  C6 on C7. There is leftward tilt of the cervical spine with a small convex  left curvature as well.     Mild degenerative changes are seen at C1-C2 anteriorly      Vertebral body heights are preserved. No acute fracture is seen. There is  chronic fracture of the T1 spinous process.     There is multilevel disc space narrowing appearing mild at C2-C3, C3-C4,  C4-C5, and moderate at C5-C6 and C6-C7. Ventral osteophytes are seen.    Disc osteophyte complexes and facet hypertrophy are also seen, with the  greatest degree of facet hypertrophy on the right at C3-C4 and on the left  at C2-C3 and C4-C5. These degenerative changes contribute to multilevel  mild central canal stenosis (eccentric to the left at C5-C6). These changes  also contribute to multilevel foraminal narrowing, severe on the right and  moderate to severe on the left at C3-C4, moderate to severe on the left  at  C4-C5, severe on the left at C5-C6, and severe on the left at C6-C7.    No prevertebral soft tissue swelling is seen. Interspinous ligament fat  planes are not effaced.    There are reticulonodular densities at the visible right lung apex.                              I reviewed the patient's CT scan results and radiology report and discussed these findings with the patient as well as any incidental findings noted.    Upon re-evaluation, the patient was reporting significant improvement in his symptoms.  He was requesting discharge home.  Given the CT findings of degenerative changes in the patient's symptoms consistent with radiculopathy, he was advised to follow-up at the spine center for reevaluation particularly if his symptoms persisted as he may benefit from an MRI.  He expressed understanding and was comfortable with this plan and with discharge home.    Standard anticipatory guidance was given to this patient which included follow up instructions,  prescribed medications (if any), and signs/symptoms that should prompt immediate return to the emergency department for re-evaluation.  The patient was also instructed to return to the emergency department immediately if he developed any worsening or concerning symptoms not otherwise discussed. The patient demonstrated understanding of these instructions and was discharged in satisfactory condition.       DISPOSITION: The patient was discharged home in stable condition.  CONDITION: Improved.   FOLLOWUP: PCP and/or spine center.      FINAL IMPRESSION(S)/DIAGNOSES:   Acute left-sided cervical radiculopathy     John Cardenas MD  08/17/23 1525     no...

## 2023-08-23 NOTE — PROGRESS NOTE BEHAVIORAL HEALTH - NSBHFUPSUICINTERVAL_PSY_A_CORE
Insurance Verification    Insurance Plan: [employer]  POS II      Payor: PPO     Are Benefits In- Network? Yes     Costs:  • Out-of-pocket: Yes: Amount $1,750  • Deductible: Yes: Amount $575  • Co-Pay: Yes: Amount 15%    Need Authorization? Yes, call health plan for auth    Need Referral from insurance? No    Is transportation a covered benefit by insurance? No    Does patient have secondary insurance? No   
none known

## 2023-10-20 ENCOUNTER — APPOINTMENT (OUTPATIENT)
Dept: GASTROENTEROLOGY | Facility: CLINIC | Age: 23
End: 2023-10-20
Payer: MEDICAID

## 2023-10-20 ENCOUNTER — OUTPATIENT (OUTPATIENT)
Dept: OUTPATIENT SERVICES | Facility: HOSPITAL | Age: 23
LOS: 1 days | End: 2023-10-20
Payer: MEDICAID

## 2023-10-20 VITALS
DIASTOLIC BLOOD PRESSURE: 71 MMHG | SYSTOLIC BLOOD PRESSURE: 142 MMHG | HEART RATE: 56 BPM | OXYGEN SATURATION: 97 % | HEIGHT: 62 IN | WEIGHT: 108 LBS | BODY MASS INDEX: 19.88 KG/M2

## 2023-10-20 VITALS — DIASTOLIC BLOOD PRESSURE: 68 MMHG | SYSTOLIC BLOOD PRESSURE: 104 MMHG

## 2023-10-20 DIAGNOSIS — Z00.00 ENCOUNTER FOR GENERAL ADULT MEDICAL EXAMINATION WITHOUT ABNORMAL FINDINGS: ICD-10-CM

## 2023-10-20 DIAGNOSIS — Z90.89 ACQUIRED ABSENCE OF OTHER ORGANS: Chronic | ICD-10-CM

## 2023-10-20 DIAGNOSIS — R13.10 DYSPHAGIA, UNSPECIFIED: ICD-10-CM

## 2023-10-20 DIAGNOSIS — K21.9 GASTRO-ESOPHAGEAL REFLUX DISEASE WITHOUT ESOPHAGITIS: Chronic | ICD-10-CM

## 2023-10-20 DIAGNOSIS — Z98.890 OTHER SPECIFIED POSTPROCEDURAL STATES: Chronic | ICD-10-CM

## 2023-10-20 PROCEDURE — 99214 OFFICE O/P EST MOD 30 MIN: CPT | Mod: GC

## 2023-10-20 PROCEDURE — 99214 OFFICE O/P EST MOD 30 MIN: CPT

## 2023-10-23 DIAGNOSIS — R13.10 DYSPHAGIA, UNSPECIFIED: ICD-10-CM

## 2023-11-24 NOTE — H&P ADULT - NSHPSOCIALHISTORY_GEN_ALL_CORE
Substance Use (street drugs): ( x ) never used  (  ) other:  Tobacco Usage:  ( x  ) never smoked   (   ) former smoker   (   ) current smoker  (     ) pack year  Alcohol Usage: None Patient's belongings returned

## 2024-04-03 ENCOUNTER — NON-APPOINTMENT (OUTPATIENT)
Age: 24
End: 2024-04-03

## 2024-04-03 ENCOUNTER — APPOINTMENT (OUTPATIENT)
Dept: NEUROLOGY | Facility: CLINIC | Age: 24
End: 2024-04-03
Payer: MEDICAID

## 2024-04-03 DIAGNOSIS — G80.9 CEREBRAL PALSY, UNSPECIFIED: ICD-10-CM

## 2024-04-03 DIAGNOSIS — G40.909 EPILEPSY, UNSPECIFIED, NOT INTRACTABLE, W/OUT STATUS EPILEPTICUS: ICD-10-CM

## 2024-04-03 PROCEDURE — 99204 OFFICE O/P NEW MOD 45 MIN: CPT

## 2024-04-03 PROCEDURE — 95816 EEG AWAKE AND DROWSY: CPT

## 2024-04-05 PROBLEM — G40.909 SEIZURE DISORDER: Status: ACTIVE | Noted: 2021-05-12

## 2024-04-05 PROBLEM — G80.9 CEREBRAL PALSY, UNSPECIFIED TYPE: Status: ACTIVE | Noted: 2024-04-05

## 2024-04-08 RX ORDER — DIVALPROEX SODIUM 125 MG/1
125 TABLET, DELAYED RELEASE ORAL
Refills: 0 | Status: ACTIVE | COMMUNITY

## 2024-04-08 RX ORDER — FLUOXETINE HYDROCHLORIDE 60 MG/1
TABLET ORAL
Refills: 0 | Status: DISCONTINUED | COMMUNITY
End: 2024-04-08

## 2024-04-08 RX ORDER — DIVALPROEX SODIUM 500 MG/1
TABLET, DELAYED RELEASE ORAL
Refills: 0 | Status: DISCONTINUED | COMMUNITY
End: 2024-04-08

## 2024-04-08 RX ORDER — CLONAZEPAM 0.5 MG/1
0.5 TABLET ORAL
Refills: 0 | Status: DISCONTINUED | COMMUNITY
End: 2024-04-08

## 2024-04-08 RX ORDER — OXCARBAZEPINE 150 MG/1
150 TABLET, FILM COATED ORAL
Refills: 0 | Status: ACTIVE | COMMUNITY

## 2024-04-12 NOTE — HISTORY OF PRESENT ILLNESS
[FreeTextEntry1] : Marcell is a 23 year old male with  PMHx autism, verbal CP, seizure disorder, bipolar disorder and GERD presented to [facility] care. Pt was under the care of Dr Ahmadi at one point and later transitioned to Dr Go now mom would like to transition his care to a new neurologist. Mom did not bring any records to the visit. Will contact Dr Ramires office to request a copy. As per mom primary historian pt was adopted as an infant. He was born at 33 weeks gestation via Csection. Pregnancy was complicated by exposure to crack/cocaine. Pt was initially in the NICU and was diagnosed with periventricular leukomalacia and CP. All of his developmental milestones were delayed. Over the years it has been difficult to control Marcell's behavior and in 2021 he was even evaluated at [facility] for suicide attempt.  As per mom only had 1 GTC event at around age 14 the rest of the episodes were describes as starring episodes, drooling or lip smacking. Most recently had episodes of bed wetting. Usually has good bowel and bladder contorl. Pt has been on Combination of Trileptal and Depakote for over 10 years. Lithium was added by psych for behavior. Marcell still attends school. Lives home with his parents.   Current regiment: Depakote 375 mgs BID Trileptal  mgs daily  Blood work from 3/2024 VPA 64.5, Trileptal not checked, Lithium 1.1

## 2024-04-12 NOTE — PHYSICAL EXAM
[General Appearance - Alert] : alert [Oriented To Time, Place, And Person] : oriented to person, place, and time [Cranial Nerves Optic (II)] : visual acuity intact bilaterally,  visual fields full to confrontation, pupils equal round and reactive to light [Cranial Nerves Oculomotor (III)] : extraocular motion intact [Cranial Nerves Trigeminal (V)] : facial sensation intact symmetrically [Cranial Nerves Facial (VII)] : face symmetrical [Cranial Nerves Vestibulocochlear (VIII)] : hearing was intact bilaterally [Cranial Nerves Glossopharyngeal (IX)] : tongue and palate midline [Cranial Nerves Accessory (XI - Cranial And Spinal)] : head turning and shoulder shrug symmetric [Cranial Nerves Hypoglossal (XII)] : there was no tongue deviation with protrusion [FreeTextEntry1] : Pt is not very cooperative with the exam. Trying to move exam table.  [Motor Strength Upper Extremities Bilaterally] : strength was normal in both upper extremities [Motor Strength Lower Extremities Bilaterally] : strength was normal in both lower extremities

## 2024-04-12 NOTE — DISCUSSION/SUMMARY
[FreeTextEntry1] : Marcell is a 23 year old male with  PMHx autism, verbal CP, seizure disorder, bipolar disorder and GERD presented to [facility] care. Plan: - request records from previous neurologist - REEG - continue same meds for now - follow up 4-5 months  Case discussed with Dr. Rosamaria Mars, DNP, ACNP-BC

## 2024-05-13 NOTE — ED PROVIDER NOTE - NSICDXPASTMEDICALHX_GEN_ALL_CORE_FT
Jardiance and Trulicity on med list.  Hasn't been able to find Trulicty in Stock.      Clarifying question(s) sent to patient.      Simi Pederson RN on 5/13/2024 at 8:38 AM     PAST MEDICAL HISTORY:  Autism     Cerebral palsy     GERD (gastroesophageal reflux disease)     Impulse disorder     BINDU on CPAP     Pneumonia NOV 2020    Scoliosis     Seizures

## 2024-05-29 NOTE — PATIENT PROFILE BEHAVIORAL HEALTH - FALLEN IN THE PAST
Quality 47: Advance Care Plan: Advance Care Planning discussed and documented in the medical record; patient did not wish or was not able to name a surrogate decision maker or provide an advance care plan.
Detail Level: Detailed
Quality 130: Documentation Of Current Medications In The Medical Record: Current Medications Documented
no

## 2024-06-12 LAB
25(OH)D3 SERPL-MCNC: 89 NG/ML
ALBUMIN SERPL ELPH-MCNC: 5 G/DL
ALP BLD-CCNC: 146 U/L
ALT SERPL-CCNC: 14 U/L
ANION GAP SERPL CALC-SCNC: 13 MMOL/L
AST SERPL-CCNC: 14 U/L
BILIRUB SERPL-MCNC: 0.3 MG/DL
BUN SERPL-MCNC: 14 MG/DL
CALCIUM SERPL-MCNC: 9.6 MG/DL
CHLORIDE SERPL-SCNC: 99 MMOL/L
CO2 SERPL-SCNC: 26 MMOL/L
CREAT SERPL-MCNC: 0.9 MG/DL
EGFR: 125 ML/MIN/1.73M2
GLUCOSE SERPL-MCNC: 95 MG/DL
HCT VFR BLD CALC: 48.2 %
HGB BLD-MCNC: 15.2 G/DL
MCHC RBC-ENTMCNC: 27.6 PG
MCHC RBC-ENTMCNC: 31.5 G/DL
MCV RBC AUTO: 87.6 FL
OXCARBAZEPINE SERPL-MCNC: 8 UG/ML
PLATELET # BLD AUTO: 145 K/UL
PMV BLD AUTO: 0 /100 WBCS
PMV BLD: 12.3 FL
POTASSIUM SERPL-SCNC: 4.8 MMOL/L
PROT SERPL-MCNC: 7.7 G/DL
RBC # BLD: 5.5 M/UL
RBC # FLD: 12.8 %
SODIUM SERPL-SCNC: 138 MMOL/L
VALPROATE SERPL-MCNC: 96 UG/ML
WBC # FLD AUTO: 6.35 K/UL

## 2024-08-21 ENCOUNTER — APPOINTMENT (OUTPATIENT)
Dept: NEUROLOGY | Facility: CLINIC | Age: 24
End: 2024-08-21
Payer: MEDICAID

## 2024-08-21 VITALS
SYSTOLIC BLOOD PRESSURE: 105 MMHG | HEART RATE: 69 BPM | BODY MASS INDEX: 20.83 KG/M2 | RESPIRATION RATE: 17 BRPM | HEIGHT: 62 IN | OXYGEN SATURATION: 99 % | DIASTOLIC BLOOD PRESSURE: 76 MMHG | WEIGHT: 113.19 LBS

## 2024-08-21 DIAGNOSIS — G80.9 CEREBRAL PALSY, UNSPECIFIED: ICD-10-CM

## 2024-08-21 PROCEDURE — 99213 OFFICE O/P EST LOW 20 MIN: CPT

## 2024-08-22 NOTE — ASSESSMENT
[FreeTextEntry1] : Marcell is a 23 year old male with PMHx autism, verbal CP, seizure disorder, bipolar disorder and GERD presented to [facility] care.   plan TSH MRI of the brain sleep study

## 2024-08-22 NOTE — HISTORY OF PRESENT ILLNESS
[FreeTextEntry1] : Marcell is here with Mom He did have a R-EEG in the office in April  that was read as normal and another Ambulatory read by pacheco Carlton that based on the available report it showed Gen slow He is otherwise at his baseline Clearly one issue that mom reports is excessive sleep throughout the day with snoring He also has frequent bed wetting No witnessed nocturnal events suggestive off seizure He wakes up at variable times but for the most part goes to his program regulary No HA his eating habits are also somehow unusual , He has this cycles during which only eats particular food . Now it is Milk no significant temper tantrums No fall

## 2024-08-22 NOTE — PHYSICAL EXAM
[FreeTextEntry1] : A/A makes eye contact slightly tangential follows commands and responds appropriately <talks about getting money to buy a house>. ?? no drift no dysmetria stable gait after a little conversation  says he is tired  lays down does not sleepp

## 2024-08-23 LAB
25(OH)D3 SERPL-MCNC: 40 NG/ML
CRP SERPL-MCNC: <3 MG/L
ERYTHROCYTE [SEDIMENTATION RATE] IN BLOOD BY WESTERGREN METHOD: 0 MM/HR
T4 SERPL-MCNC: 4.9 UG/DL
TSH SERPL-ACNC: 3.83 UIU/ML

## 2024-08-24 LAB — ACTH SER-ACNC: 31.8 PG/ML

## 2024-08-27 LAB — ANA SER IF-ACNC: NEGATIVE

## 2024-09-03 NOTE — PROGRESS NOTE BEHAVIORAL HEALTH - SECONDARY DX1
[Father] : father
Impulse control disorder

## 2024-09-06 NOTE — ED ADULT TRIAGE NOTE - IDEAL BODY WEIGHT(KG)
Chart reviewed for medication refill protocol.   Medication(s) requested:   Omeprazole 20 mg -- 1 tablet PO daily.   Last OV: 05/15/2024 with Dr. Fink   Upcoming OV: None; due back around 09/2024; on wait list.   Medication signed per protocol.    
Lab orders entered.  
Labs completed.  Dr. Fink to review with patient at OV.  
Labs resulted.  Dr. Fink to review with patient at OV.  
PSR's, please call patient to schedule FU in September with non fasting blood/urine tests prior.   
Patient scheduled for follow up on 9/13/24. Labs are scheduled for 9/6/24. Please place NON fasting lab orders.   
Schedule patient with Dr. Fink in [place] as 30 min Follow Up  in SEPT. ALSO schedule a Non fasting blood and urine labs 1 week prior to follow up (Ok to schedule labs with no orders, message to order labs was sent to nursing staff). LMOM.       
59

## 2024-09-19 ENCOUNTER — OUTPATIENT (OUTPATIENT)
Dept: OUTPATIENT SERVICES | Facility: HOSPITAL | Age: 24
LOS: 1 days | End: 2024-09-19
Payer: MEDICAID

## 2024-09-19 ENCOUNTER — RESULT REVIEW (OUTPATIENT)
Age: 24
End: 2024-09-19

## 2024-09-19 DIAGNOSIS — G40.909 EPILEPSY, UNSPECIFIED, NOT INTRACTABLE, WITHOUT STATUS EPILEPTICUS: ICD-10-CM

## 2024-09-19 DIAGNOSIS — Z00.8 ENCOUNTER FOR OTHER GENERAL EXAMINATION: ICD-10-CM

## 2024-09-19 DIAGNOSIS — Z90.89 ACQUIRED ABSENCE OF OTHER ORGANS: Chronic | ICD-10-CM

## 2024-09-19 DIAGNOSIS — Z98.890 OTHER SPECIFIED POSTPROCEDURAL STATES: Chronic | ICD-10-CM

## 2024-09-19 DIAGNOSIS — K21.9 GASTRO-ESOPHAGEAL REFLUX DISEASE WITHOUT ESOPHAGITIS: Chronic | ICD-10-CM

## 2024-09-19 PROCEDURE — 70551 MRI BRAIN STEM W/O DYE: CPT | Mod: 26

## 2024-09-19 PROCEDURE — 70551 MRI BRAIN STEM W/O DYE: CPT

## 2024-09-19 PROCEDURE — 76377 3D RENDER W/INTRP POSTPROCES: CPT | Mod: 26

## 2024-09-19 PROCEDURE — 76377 3D RENDER W/INTRP POSTPROCES: CPT

## 2024-09-20 DIAGNOSIS — G40.909 EPILEPSY, UNSPECIFIED, NOT INTRACTABLE, WITHOUT STATUS EPILEPTICUS: ICD-10-CM

## 2024-11-13 ENCOUNTER — APPOINTMENT (OUTPATIENT)
Dept: SLEEP CENTER | Facility: HOSPITAL | Age: 24
End: 2024-11-13

## 2025-01-02 NOTE — ED ADULT TRIAGE NOTE - HEIGHT IN FEET
F/u 2 wks  Continue wearing knee immobilizer for about 4-6 wks  Icing/heat/OTC pain meds as needed.    
5

## 2025-01-28 NOTE — REASON FOR VISIT
See handout regarding care following an ERCP    Call and make follow up appointments; call sooner with any questions or concerns      It is recommended that within a week you have a CMP drawn and LFT's-please follow up with your Dr to have these labs drawn   [Home] : at home, [unfilled] , at the time of the visit. [Medical Office: ([place] [facility])___] : at the medical office located in  [Verbal consent obtained from patient] : the patient, [unfilled] [Follow Up] : a follow up visit [Mother] : mother [FreeTextEntry1] : scoliosis

## 2025-02-06 ENCOUNTER — NON-APPOINTMENT (OUTPATIENT)
Age: 25
End: 2025-02-06

## 2025-02-06 ENCOUNTER — APPOINTMENT (OUTPATIENT)
Dept: NEUROLOGY | Facility: CLINIC | Age: 25
End: 2025-02-06
Payer: MEDICAID

## 2025-02-06 DIAGNOSIS — G40.909 EPILEPSY, UNSPECIFIED, NOT INTRACTABLE, W/OUT STATUS EPILEPTICUS: ICD-10-CM

## 2025-02-06 DIAGNOSIS — F84.9 PERVASIVE DEVELOPMENTAL DISORDER, UNSPECIFIED: ICD-10-CM

## 2025-02-06 PROCEDURE — 99213 OFFICE O/P EST LOW 20 MIN: CPT

## 2025-02-07 RX ORDER — GUANFACINE 3 MG/1
3 TABLET, EXTENDED RELEASE ORAL EVERY MORNING
Refills: 0 | Status: ACTIVE | COMMUNITY

## 2025-02-07 RX ORDER — FAMOTIDINE 20 MG/1
20 TABLET, FILM COATED ORAL TWICE DAILY
Refills: 0 | Status: ACTIVE | COMMUNITY

## 2025-02-07 RX ORDER — AMANTADINE HYDROCHLORIDE 100 1/1
100 TABLET ORAL TWICE DAILY
Refills: 0 | Status: ACTIVE | COMMUNITY

## 2025-02-07 RX ORDER — SERTRALINE 25 MG/1
25 TABLET, FILM COATED ORAL DAILY
Refills: 0 | Status: ACTIVE | COMMUNITY

## 2025-02-07 RX ORDER — SUCRALFATE 1 G/10ML
1 SUSPENSION ORAL
Refills: 0 | Status: ACTIVE | COMMUNITY

## 2025-02-07 RX ORDER — PERPHENAZINE 8 MG/1
8 TABLET ORAL 3 TIMES DAILY
Refills: 0 | Status: ACTIVE | COMMUNITY

## 2025-02-07 RX ORDER — FOLIC ACID 1 MG/1
1 TABLET ORAL DAILY
Refills: 0 | Status: ACTIVE | COMMUNITY

## 2025-02-07 RX ORDER — AMANTADINE HYDROCHLORIDE 100 MG/1
100 CAPSULE ORAL TWICE DAILY
Refills: 0 | Status: ACTIVE | COMMUNITY

## 2025-02-07 RX ORDER — LITHIUM CARBONATE 450 MG/1
450 TABLET ORAL DAILY
Refills: 0 | Status: ACTIVE | COMMUNITY

## 2025-02-07 RX ORDER — LORAZEPAM 2 MG/1
2 TABLET ORAL 3 TIMES DAILY
Refills: 0 | Status: ACTIVE | COMMUNITY

## 2025-08-05 ENCOUNTER — APPOINTMENT (OUTPATIENT)
Dept: NEUROLOGY | Facility: CLINIC | Age: 25
End: 2025-08-05
Payer: MEDICAID

## 2025-08-05 VITALS
HEART RATE: 72 BPM | BODY MASS INDEX: 22.08 KG/M2 | SYSTOLIC BLOOD PRESSURE: 99 MMHG | WEIGHT: 120 LBS | OXYGEN SATURATION: 97 % | DIASTOLIC BLOOD PRESSURE: 66 MMHG | RESPIRATION RATE: 16 BRPM | HEIGHT: 62 IN

## 2025-08-05 DIAGNOSIS — G40.909 EPILEPSY, UNSPECIFIED, NOT INTRACTABLE, W/OUT STATUS EPILEPTICUS: ICD-10-CM

## 2025-08-05 PROCEDURE — 99213 OFFICE O/P EST LOW 20 MIN: CPT

## 2025-08-08 LAB
25(OH)D3 SERPL-MCNC: 35 NG/ML
ALBUMIN SERPL ELPH-MCNC: 4.8 G/DL
ALP BLD-CCNC: 132 U/L
ALT SERPL-CCNC: 12 U/L
AMYLASE/CREAT SERPL: 41 U/L
ANION GAP SERPL CALC-SCNC: 13 MMOL/L
AST SERPL-CCNC: 19 U/L
BILIRUB SERPL-MCNC: 0.3 MG/DL
BUN SERPL-MCNC: 16 MG/DL
CALCIUM SERPL-MCNC: 9.6 MG/DL
CHLORIDE SERPL-SCNC: 99 MMOL/L
CO2 SERPL-SCNC: 24 MMOL/L
CREAT SERPL-MCNC: 0.9 MG/DL
EGFRCR SERPLBLD CKD-EPI 2021: 124 ML/MIN/1.73M2
GLUCOSE SERPL-MCNC: 88 MG/DL
HCT VFR BLD CALC: 45.6 %
HGB BLD-MCNC: 14.7 G/DL
LPL SERPL-CCNC: 59 U/L
MAGNESIUM SERPL-MCNC: 1.9 MG/DL
MCHC RBC-ENTMCNC: 27 PG
MCHC RBC-ENTMCNC: 32.2 G/DL
MCV RBC AUTO: 83.7 FL
PLATELET # BLD AUTO: 179 K/UL
PMV BLD AUTO: 0 /100 WBCS
PMV BLD: 11.4 FL
POTASSIUM SERPL-SCNC: 4.8 MMOL/L
PROT SERPL-MCNC: 7.7 G/DL
RBC # BLD: 5.45 M/UL
RBC # FLD: 13.9 %
SODIUM SERPL-SCNC: 136 MMOL/L
VALPROATE SERPL-MCNC: 61 UG/ML
WBC # FLD AUTO: 6.21 K/UL

## 2025-08-12 LAB — OXCARBAZEPINE SERPL-MCNC: 7 UG/ML

## 2025-08-12 RX ORDER — OXCARBAZEPINE 150 MG/1
150 TABLET, EXTENDED RELEASE ORAL
Qty: 30 | Refills: 3 | Status: ACTIVE | COMMUNITY
Start: 2025-08-12 | End: 1900-01-01

## 2025-08-12 RX ORDER — OXCARBAZEPINE 300 MG/1
300 TABLET, EXTENDED RELEASE ORAL
Qty: 30 | Refills: 3 | Status: ACTIVE | COMMUNITY
Start: 2025-08-12 | End: 1900-01-01